# Patient Record
Sex: FEMALE | Race: BLACK OR AFRICAN AMERICAN | Employment: UNEMPLOYED | URBAN - METROPOLITAN AREA
[De-identification: names, ages, dates, MRNs, and addresses within clinical notes are randomized per-mention and may not be internally consistent; named-entity substitution may affect disease eponyms.]

---

## 2019-05-15 ENCOUNTER — INITIAL CONSULT (OUTPATIENT)
Dept: BARIATRICS/WEIGHT MGMT | Age: 19
End: 2019-05-15
Payer: COMMERCIAL

## 2019-05-15 VITALS
HEIGHT: 68 IN | BODY MASS INDEX: 44.41 KG/M2 | DIASTOLIC BLOOD PRESSURE: 84 MMHG | HEART RATE: 73 BPM | TEMPERATURE: 97.4 F | WEIGHT: 293 LBS | RESPIRATION RATE: 20 BRPM | SYSTOLIC BLOOD PRESSURE: 141 MMHG

## 2019-05-15 DIAGNOSIS — R10.13 EPIGASTRIC PAIN: Primary | ICD-10-CM

## 2019-05-15 PROCEDURE — G8417 CALC BMI ABV UP PARAM F/U: HCPCS | Performed by: SURGERY

## 2019-05-15 PROCEDURE — 99202 OFFICE O/P NEW SF 15 MIN: CPT

## 2019-05-15 PROCEDURE — G8427 DOCREV CUR MEDS BY ELIG CLIN: HCPCS | Performed by: SURGERY

## 2019-05-15 PROCEDURE — 99204 OFFICE O/P NEW MOD 45 MIN: CPT | Performed by: SURGERY

## 2019-05-15 PROCEDURE — 1036F TOBACCO NON-USER: CPT | Performed by: SURGERY

## 2019-05-15 NOTE — PROGRESS NOTES
Marcus Chang  5/15/2019  [facility]    Initial Evaluation  Bariatric Surgery and EGD       Subjective:  CHIEF COMPLAINT: Morbid obesity, malnutrition, GERD    HISTORY OF PRESENT ILLNESS: Christopher Smith is a morbidly-obese 19 y.o.  female, who weighs (!) 336 lb (152.4 kg). She is 171 pounds over her ideal body weight. The Body mass index is 51.09 kg/m². She has multiple medical problems aggravated by her obesity. She wishes to have bariatric surgery so that she can lose a large amount of weight and keep the weight off. I have [facility] with [facility] in the [facility] where we discussed the surgery in great detail and went over the risks and benefits. She has watched our informational video so she understands all of the extensive risks involved. She states that she understands all of the risks and wishes to proceed with the evaluation. Past Medical HistoryThere is no problem list on file for this patient. Past Surgical HistoryNo past surgical history on file. Allergies: Latex     Home Medications  Current Outpatient Medications   Medication Sig Dispense Refill    naproxen (NAPROSYN) 500 MG tablet Take 500 mg by mouth 2 times daily as needed for Pain      famotidine (PEPCID) 20 MG tablet Take 1 tablet by mouth 2 times daily for 10 days 20 tablet 0     No current facility-administered medications for this visit. Social History:   TOBACCO:   reports that she has never smoked. She has never used smokeless tobacco.  All smokers must join the free smoking cessation program and stop smoking for 3 months before having any Bariatric surgery. ETOH:    reports that she does not drink alcohol. The patient has a family history that is negative for severe cardiovascular or respiratory issues, negative for reaction to anesthesia.       Review of Systems    Review of Systems - General ROS: negative for - chills, fatigue or malaise  ENT ROS: negative for - hearing change, nasal congestion or nasal discharge  Allergy and Immunology ROS: negative for - hives, itchy/watery eyes or nasal congestion  Hematological and Lymphatic ROS: negative for - blood clots, blood transfusions, bruising or fatigue  Endocrine ROS: negative for - malaise/lethargy, mood swings, palpitations or polydipsia/polyuria  Breast ROS: negative for - new or changing breast lumps or nipple changes  Respiratory ROS: negative for - sputum changes, stridor, tachypnea or wheezing  Cardiovascular ROS: negative for - irregular heartbeat, loss of consciousness, murmur or orthopnea  Gastrointestinal ROS: negative for - constipation, diarrhea, gas/bloating, heartburn or hematemesis  Genito-Urinary ROS: negative for - genital discharge, genital ulcers or hematuria  Musculoskeletal ROS: negative for - gait disturbance, muscle pain or muscular weakness}    Physical Exam:   VITALS: BP (!) 141/84 (Site: Left Upper Arm, Position: Sitting, Cuff Size: Medium Adult)   Pulse 73   Temp 97.4 °F (36.3 °C) (Temporal)   Resp 20   Ht 5' 8\" (1.727 m)   Wt (!) 336 lb (152.4 kg)   LMP 05/02/2019 (Exact Date)   BMI 51.09 kg/m²   General appearance: alert, appears stated age and cooperative  Head: Normocephalic, without obvious abnormality, atraumatic  Neck: no adenopathy, no carotid bruit, no JVD, supple, symmetrical, trachea midline and thyroid not enlarged, symmetric, no tenderness/mass/nodules  Lungs: clear to auscultation bilaterally  Heart: regular rate and rhythm  Abdomen: soft, non-tender; bowel sounds normal; no masses,  no organomegaly  Extremities: extremities normal, atraumatic, no cyanosis or edema    Assessment:  Morbid obesity with inability to keep the weight off with diet and exercise. She is interested in Laparoscopic Angelique-en- Y Gastric Bypass or Sleeve Gastrectomy. We discussed that our goal is to ameliorate the medical problems and not to obtain a specific body mass index.  She understands the risks and benefits, and wishes to proceed with the evaluation. Plan:  Psych evaluation, medical and cardio/pulmonary clearance, mammogram, pap test, gallbladder ultrasound. These patients often have vitamin deficiencies so I will do screening labs for malnutrition. I will do an upper endoscopy to check for hiatal hernias, ulcers and H. Pylori while we wait for insurance approval for the surgery.     Physician Signature: Electronically signed by Dr. Tolu Cisneros MD    Send copy of H&P to PCP, JANINE Salmeron NP

## 2019-05-15 NOTE — PATIENT INSTRUCTIONS
What is the next step to proceed with weight loss surgery? Please be aware that any co-pays or deductibles may be requested prior to testing and / or procedures. You will need to schedule a psychological evaluation for weight loss surgery. Patients will be required to complete all psychological testing as required by the psychologist. Patients must follow all of the psychologist's recommendations before weight loss surgery can be scheduled. The evaluation must be done a standard way for weight loss surgery. We strongly recommend that you contact one of our preferred providers listed below to arrange this:    Dr. Eugene Luke, PhD [address]        [phone]      Dr. Cheyenne Garcia, PhD   [facility]. [place], [place]         [phone]    You will also need to plan on attending a 2 hour nutrition class at the [facility] prior to your surgery. We will schedule this for you when we schedule your surgery. Please remember to have your labs drawn prior to your scheduled appointment to review the results of your testing. Please remember, that while we will submit your case to insurance for surgery authorization, it is your responsibility to know if your plan covers weight loss surgery and keep up-to-date with changes to your insurance coverage. We will do everything possible to help you get approved for weight loss surgery, but cannot guarantee an approval.     Please note that you will not be submitted to your insurance company until all   pre-operative testing requirements are met.

## 2019-05-20 ENCOUNTER — PREP FOR PROCEDURE (OUTPATIENT)
Dept: SURGERY | Age: 19
End: 2019-05-20

## 2019-05-20 RX ORDER — SODIUM CHLORIDE 0.9 % (FLUSH) 0.9 %
10 SYRINGE (ML) INJECTION EVERY 12 HOURS SCHEDULED
Status: CANCELLED | OUTPATIENT
Start: 2019-05-20

## 2019-05-20 RX ORDER — SODIUM CHLORIDE, SODIUM LACTATE, POTASSIUM CHLORIDE, CALCIUM CHLORIDE 600; 310; 30; 20 MG/100ML; MG/100ML; MG/100ML; MG/100ML
INJECTION, SOLUTION INTRAVENOUS CONTINUOUS
Status: CANCELLED | OUTPATIENT
Start: 2019-05-20

## 2019-05-20 RX ORDER — 0.9 % SODIUM CHLORIDE 0.9 %
10 VIAL (ML) INJECTION PRN
Status: CANCELLED | OUTPATIENT
Start: 2019-05-20

## 2019-06-04 ENCOUNTER — HOSPITAL ENCOUNTER (OUTPATIENT)
Dept: ULTRASOUND IMAGING | Age: 19
Discharge: HOME | End: 2019-06-04
Payer: COMMERCIAL

## 2019-06-04 DIAGNOSIS — R10.13 EPIGASTRIC PAIN: ICD-10-CM

## 2019-06-04 PROCEDURE — 76705 ECHO EXAM OF ABDOMEN: CPT

## 2019-06-06 RX ORDER — CYCLOBENZAPRINE HCL 10 MG
10 TABLET ORAL 3 TIMES DAILY PRN
COMMUNITY
End: 2020-06-04

## 2019-06-06 NOTE — PROGRESS NOTES
[facility]                                                                                                                    PRE OP INSTRUCTIONS FOR  Elbert Champagne        Date: 6/6/2019    Date of surgery: 6/7/19   Arrival Time: Hospital will call you between 5pm and 7pm with your final arrival time for surgery    1. Do not eat or drink anything after midnight prior to surgery. This includes no water, chewing gum, mints or ice chips. 2. Take the following medications with a small sip of water on the morning of Surgery: none     3. Diabetics may take evening dose of insulin but none after midnight. If you feel symptomatic or low blood sugar morning of surgery drink 1-2 ounces of apple juice only. 4. Aspirin, Ibuprofen, Advil, Naproxen, Vitamin E and other Anti-inflammatory products should be stopped  before surgery  as directed by your physician. Take Tylenol only unless instructed otherwise by your surgeon. 5. Check with your Doctor regarding stopping Plavix, Coumadin, Lovenox, Eliquis, Effient, or other blood thinners. 6. Do not smoke,use illicit drugs and do not drink any alcoholic beverages 24 hours prior to surgery. 7. You may brush your teeth the morning of surgery. DO NOT SWALLOW WATER    8. You MUST make arrangements for a responsible adult to take you home after your surgery. You will not be allowed to leave alone or drive yourself home. It is strongly suggested someone stay with you the first 24 hrs. Your surgery will be cancelled if you do not have a ride home. 9. PEDIATRIC PATIENTS ONLY:  A parent/legal guardian must accompany a child scheduled for surgery and plan to stay at the hospital until the child is discharged. Please do not bring other children with you.     10. Please wear simple, loose fitting clothing to the hospital.  Berhane Lovell not bring valuables (money, credit cards, checkbooks, etc.) Do not wear any makeup (including no eye makeup) or nail polish on your fingers or toes. 11. DO NOT wear any jewelry or piercings on day of surgery. All body piercing jewelry must be removed. 12. Shower the night before surgery with _x__Antibacterial soap /PARISH WIPES________    13. TOTAL JOINT REPLACEMENT/HYSTERECTOMY PATIENTS ONLY---Remember to bring Blood Bank bracelet to the hospital on the day of surgery. 14. If you have a Living Will and Durable Power of  for Healthcare, please bring in a copy. 15. If appropriate bring crutches, inspirex, WALKER, CANE etc... 12. Notify your Surgeon if you develop any illness between now and surgery time, cough, cold, fever, sore throat, nausea, vomiting, etc.  Please notify your surgeon if you experience dizziness, shortness of breath or blurred vision between now & the time of your surgery. 17. If you have ___dentures, they will be removed before going to the OR; we will provide you a container. If you wear ___contact lenses or _x__glasses, they will be removed; please bring a case for them. 18. To provide excellent care visitors will be limited to 2 in the room at any given time. 19. Please bring picture ID and insurance card. 20. Sleep apnea patients need to bring CPAP AND SETTINGS to hospital on day of surgery. 21. During flu season no children under the age of 15 are permitted in the hospital for the safety of all patients. 22. Other                  Please call AMBULATORY CARE if you have any further questions.    [facility]     [address]

## 2019-06-07 ENCOUNTER — ANESTHESIA EVENT (OUTPATIENT)
Dept: ENDOSCOPY | Age: 19
End: 2019-06-07
Payer: COMMERCIAL

## 2019-06-07 ENCOUNTER — ANESTHESIA (OUTPATIENT)
Dept: ENDOSCOPY | Age: 19
End: 2019-06-07
Payer: COMMERCIAL

## 2019-06-07 ENCOUNTER — HOSPITAL ENCOUNTER (OUTPATIENT)
Age: 19
Setting detail: OUTPATIENT SURGERY
Discharge: HOME | End: 2019-06-07
Attending: SURGERY | Admitting: SURGERY
Payer: COMMERCIAL

## 2019-06-07 VITALS
BODY MASS INDEX: 44.41 KG/M2 | TEMPERATURE: 97.6 F | DIASTOLIC BLOOD PRESSURE: 78 MMHG | RESPIRATION RATE: 16 BRPM | HEIGHT: 68 IN | SYSTOLIC BLOOD PRESSURE: 118 MMHG | OXYGEN SATURATION: 100 % | HEART RATE: 72 BPM | WEIGHT: 293 LBS

## 2019-06-07 VITALS
SYSTOLIC BLOOD PRESSURE: 143 MMHG | OXYGEN SATURATION: 100 % | DIASTOLIC BLOOD PRESSURE: 72 MMHG | RESPIRATION RATE: 30 BRPM

## 2019-06-07 DIAGNOSIS — R10.13 EPIGASTRIC PAIN: ICD-10-CM

## 2019-06-07 LAB
ALBUMIN SERPL-MCNC: 3.8 G/DL (ref 3.5–5.2)
ALP BLD-CCNC: 87 U/L (ref 35–104)
ALT SERPL-CCNC: 17 U/L (ref 0–32)
ANION GAP SERPL CALCULATED.3IONS-SCNC: 8 MMOL/L (ref 7–16)
AST SERPL-CCNC: 15 U/L (ref 0–31)
BILIRUB SERPL-MCNC: 0.3 MG/DL (ref 0–1.2)
BUN BLDV-MCNC: 7 MG/DL (ref 6–20)
CALCIUM SERPL-MCNC: 8.9 MG/DL (ref 8.6–10.2)
CHLORIDE BLD-SCNC: 102 MMOL/L (ref 98–107)
CHOLESTEROL, TOTAL: 151 MG/DL (ref 0–199)
CO2: 27 MMOL/L (ref 22–29)
CREAT SERPL-MCNC: 0.6 MG/DL (ref 0.5–1)
FERRITIN: 7 NG/ML
FOLATE: 6 NG/ML (ref 4.8–24.2)
GFR AFRICAN AMERICAN: >60
GFR NON-AFRICAN AMERICAN: >60 ML/MIN/1.73
GLUCOSE BLD-MCNC: 103 MG/DL (ref 74–99)
HBA1C MFR BLD: 4.9 % (ref 4–5.6)
HCG(URINE) PREGNANCY TEST: NEGATIVE
HCT VFR BLD CALC: 30.4 % (ref 34–48)
HEMOGLOBIN: 8.8 G/DL (ref 11.5–15.5)
MCH RBC QN AUTO: 22.9 PG (ref 26–35)
MCHC RBC AUTO-ENTMCNC: 28.9 % (ref 32–34.5)
MCV RBC AUTO: 79.2 FL (ref 80–99.9)
PDW BLD-RTO: 15.4 FL (ref 11.5–15)
PLATELET # BLD: 264 E9/L (ref 130–450)
PMV BLD AUTO: 9.4 FL (ref 7–12)
POTASSIUM SERPL-SCNC: 3.9 MMOL/L (ref 3.5–5)
RBC # BLD: 3.84 E12/L (ref 3.5–5.5)
SODIUM BLD-SCNC: 137 MMOL/L (ref 132–146)
TOTAL PROTEIN: 7 G/DL (ref 6.4–8.3)
TRIGL SERPL-MCNC: 80 MG/DL (ref 0–149)
VITAMIN B-12: 371 PG/ML (ref 211–946)
VITAMIN D 25-HYDROXY: 7 NG/ML (ref 30–100)
WBC # BLD: 4.3 E9/L (ref 4.5–11.5)

## 2019-06-07 PROCEDURE — 82607 VITAMIN B-12: CPT

## 2019-06-07 PROCEDURE — 84630 ASSAY OF ZINC: CPT

## 2019-06-07 PROCEDURE — 36415 COLL VENOUS BLD VENIPUNCTURE: CPT

## 2019-06-07 PROCEDURE — 7100000011 HC PHASE II RECOVERY - ADDTL 15 MIN: Performed by: SURGERY

## 2019-06-07 PROCEDURE — 85027 COMPLETE CBC AUTOMATED: CPT

## 2019-06-07 PROCEDURE — 7100000010 HC PHASE II RECOVERY - FIRST 15 MIN: Performed by: SURGERY

## 2019-06-07 PROCEDURE — 88305 TISSUE EXAM BY PATHOLOGIST: CPT

## 2019-06-07 PROCEDURE — 99024 POSTOP FOLLOW-UP VISIT: CPT | Performed by: SURGERY

## 2019-06-07 PROCEDURE — 81025 URINE PREGNANCY TEST: CPT

## 2019-06-07 PROCEDURE — 80053 COMPREHEN METABOLIC PANEL: CPT

## 2019-06-07 PROCEDURE — 84478 ASSAY OF TRIGLYCERIDES: CPT

## 2019-06-07 PROCEDURE — 88342 IMHCHEM/IMCYTCHM 1ST ANTB: CPT

## 2019-06-07 PROCEDURE — 82746 ASSAY OF FOLIC ACID SERUM: CPT

## 2019-06-07 PROCEDURE — 84425 ASSAY OF VITAMIN B-1: CPT

## 2019-06-07 PROCEDURE — 82306 VITAMIN D 25 HYDROXY: CPT

## 2019-06-07 PROCEDURE — 2580000003 HC RX 258: Performed by: SURGERY

## 2019-06-07 PROCEDURE — 43239 EGD BIOPSY SINGLE/MULTIPLE: CPT | Performed by: SURGERY

## 2019-06-07 PROCEDURE — 82465 ASSAY BLD/SERUM CHOLESTEROL: CPT

## 2019-06-07 PROCEDURE — 3700000000 HC ANESTHESIA ATTENDED CARE: Performed by: SURGERY

## 2019-06-07 PROCEDURE — 82728 ASSAY OF FERRITIN: CPT

## 2019-06-07 PROCEDURE — 3609012400 HC EGD TRANSORAL BIOPSY SINGLE/MULTIPLE: Performed by: SURGERY

## 2019-06-07 PROCEDURE — 2709999900 HC NON-CHARGEABLE SUPPLY: Performed by: SURGERY

## 2019-06-07 PROCEDURE — 6360000002 HC RX W HCPCS: Performed by: NURSE ANESTHETIST, CERTIFIED REGISTERED

## 2019-06-07 PROCEDURE — 2580000003 HC RX 258: Performed by: NURSE ANESTHETIST, CERTIFIED REGISTERED

## 2019-06-07 PROCEDURE — 83036 HEMOGLOBIN GLYCOSYLATED A1C: CPT

## 2019-06-07 RX ORDER — SODIUM CHLORIDE 0.9 % (FLUSH) 0.9 %
10 SYRINGE (ML) INJECTION PRN
Status: DISCONTINUED | OUTPATIENT
Start: 2019-06-07 | End: 2019-06-07 | Stop reason: HOSPADM

## 2019-06-07 RX ORDER — SODIUM CHLORIDE 0.9 % (FLUSH) 0.9 %
10 SYRINGE (ML) INJECTION EVERY 12 HOURS SCHEDULED
Status: DISCONTINUED | OUTPATIENT
Start: 2019-06-07 | End: 2019-06-07 | Stop reason: HOSPADM

## 2019-06-07 RX ORDER — SODIUM CHLORIDE, SODIUM LACTATE, POTASSIUM CHLORIDE, CALCIUM CHLORIDE 600; 310; 30; 20 MG/100ML; MG/100ML; MG/100ML; MG/100ML
INJECTION, SOLUTION INTRAVENOUS CONTINUOUS PRN
Status: DISCONTINUED | OUTPATIENT
Start: 2019-06-07 | End: 2019-06-07 | Stop reason: SDUPTHER

## 2019-06-07 RX ORDER — SODIUM CHLORIDE, SODIUM LACTATE, POTASSIUM CHLORIDE, CALCIUM CHLORIDE 600; 310; 30; 20 MG/100ML; MG/100ML; MG/100ML; MG/100ML
INJECTION, SOLUTION INTRAVENOUS CONTINUOUS
Status: DISCONTINUED | OUTPATIENT
Start: 2019-06-07 | End: 2019-06-07 | Stop reason: HOSPADM

## 2019-06-07 RX ORDER — PROPOFOL 10 MG/ML
INJECTION, EMULSION INTRAVENOUS PRN
Status: DISCONTINUED | OUTPATIENT
Start: 2019-06-07 | End: 2019-06-07 | Stop reason: SDUPTHER

## 2019-06-07 RX ADMIN — PROPOFOL 200 MG: 10 INJECTION, EMULSION INTRAVENOUS at 09:56

## 2019-06-07 RX ADMIN — SODIUM CHLORIDE, POTASSIUM CHLORIDE, SODIUM LACTATE AND CALCIUM CHLORIDE: 600; 310; 30; 20 INJECTION, SOLUTION INTRAVENOUS at 09:46

## 2019-06-07 RX ADMIN — SODIUM CHLORIDE, POTASSIUM CHLORIDE, SODIUM LACTATE AND CALCIUM CHLORIDE: 600; 310; 30; 20 INJECTION, SOLUTION INTRAVENOUS at 09:29

## 2019-06-07 ASSESSMENT — PAIN SCALES - GENERAL
PAINLEVEL_OUTOF10: 0
PAINLEVEL_OUTOF10: 0

## 2019-06-07 ASSESSMENT — PAIN - FUNCTIONAL ASSESSMENT: PAIN_FUNCTIONAL_ASSESSMENT: 0-10

## 2019-06-07 ASSESSMENT — LIFESTYLE VARIABLES: SMOKING_STATUS: 0

## 2019-06-07 NOTE — ANESTHESIA POSTPROCEDURE EVALUATION
Department of Anesthesiology  Postprocedure Note    Patient: Ricardo Lizama  MRN: [id number]  YOB: 2000  Date of evaluation: 6/7/2019  Time:  1:30 PM     Procedure Summary     Date:  06/07/19 Room / Location:  [facility] 01 / [facility] ENDOSCOPY    Anesthesia Start:  0946 Anesthesia Stop:  4961    Procedure:  EGD BIOPSY (N/A ) Diagnosis:  (GERD)    Surgeon:  Anne Javed MD Responsible Provider:  Angel Waters MD    Anesthesia Type:  MAC ASA Status:  3          Anesthesia Type: MAC    Ulises Phase I: Ulises Score: 10    Ulises Phase II: Ulises Score: 10    Last vitals: Reviewed and per EMR flowsheets.        Anesthesia Post Evaluation    Patient location during evaluation: PACU  Patient participation: complete - patient participated  Level of consciousness: awake and alert  Airway patency: patent  Nausea & Vomiting: no nausea and no vomiting  Complications: no  Cardiovascular status: hemodynamically stable  Respiratory status: acceptable  Hydration status: euvolemic

## 2019-06-07 NOTE — OP NOTE
SURGEON: Cinthia Mederos M.D. PREOPERATIVE DIAGNOSES:  gerd    POSTOPERATIVE DIAGNOSES: normal EGD     OPERATION: Pexutrfe-lctpfs-vfvyeypoundi with biopsy    ANESTHESIA: LMAC    COMPLICATIONS: None. OPERATIONS: The patient was placed on the table in left lateral decubitus position and sedated. Bite block was placed. A lubricated scope was easily passed into the upper esophagus which looked normal. The distal esophagus looked normal. The scope was passed into the stomach and retroflexed. There was no hiatal hernia. The scope was passed down toward the pylorus. The antral mucosa all looked normal. Biopsy was taken to check for H. pylori. The scope was then passed through the pylorus into the duodenal bulb which looked normal, then around to the distal duodenum which looked normal, and the scope was then withdrawn. The GE junction was at 36 cm from the teeth. The patient tolerated the procedure well.      Physician Signature: Electronically signed by Dr. Fernando Gamino

## 2019-06-07 NOTE — H&P
negative for - hearing change, nasal congestion or nasal discharge  Allergy and Immunology ROS: negative for - hives, itchy/watery eyes or nasal congestion  Hematological and Lymphatic ROS: negative for - blood clots, blood transfusions, bruising or fatigue  Endocrine ROS: negative for - malaise/lethargy, mood swings, palpitations or polydipsia/polyuria  Breast ROS: negative for - new or changing breast lumps or nipple changes  Respiratory ROS: negative for - sputum changes, stridor, tachypnea or wheezing  Cardiovascular ROS: negative for - irregular heartbeat, loss of consciousness, murmur or orthopnea  Gastrointestinal ROS: negative for - constipation, diarrhea, gas/bloating, heartburn or hematemesis  Genito-Urinary ROS: negative for - genital discharge, genital ulcers or hematuria  Musculoskeletal ROS: negative for - gait disturbance, muscle pain or muscular weakness}     Physical Exam:   BP (!) 143/74   Pulse 92   Temp 96.5 °F (35.8 °C) (Temporal)   Resp 20   Ht 5' 8\" (1.727 m)   Wt (!) 339 lb 3.2 oz (153.9 kg)   LMP 06/01/2019   SpO2 100%   BMI 51.58 kg/m²      General appearance: alert, appears stated age and cooperative  Head: Normocephalic, without obvious abnormality, atraumatic  Neck: no adenopathy, no carotid bruit, no JVD, supple, symmetrical, trachea midline and thyroid not enlarged, symmetric, no tenderness/mass/nodules  Lungs: clear to auscultation bilaterally  Heart: regular rate and rhythm  Abdomen: soft, non-tender; bowel sounds normal; no masses,  no organomegaly  Extremities: extremities normal, atraumatic, no cyanosis or edema     Assessment:  Morbid obesity with inability to keep the weight off with diet and exercise. She is interested in Laparoscopic Angelique-en- Y Gastric Bypass or Sleeve Gastrectomy. We discussed that our goal is to ameliorate the medical problems and not to obtain a specific body mass index.  She understands the risks and benefits, and wishes to proceed with the evaluation.     Plan:  Psych evaluation, medical and cardio/pulmonary clearance, mammogram, pap test, gallbladder ultrasound. These patients often have vitamin deficiencies so I will do screening labs for malnutrition.  I will do an upper endoscopy to check for hiatal hernias, ulcers and H. Pylori while we wait for insurance approval for the surgery.     Physician Signature: Electronically signed by Dr. Ela Wallace MD

## 2019-06-07 NOTE — ANESTHESIA PRE PROCEDURE
Department of Anesthesiology  Preprocedure Note       Name:  Abebe Goel   Age:  19 y.o.  :  2000                                          MRN:  [id number]         Date:  2019      Surgeon: Stefanie Teague):  Lasha Torres MD    Procedure: EGD ESOPHAGOGASTRODUODENOSCOPY (N/A )    Medications prior to admission:   Prior to Admission medications    Medication Sig Start Date End Date Taking? Authorizing Provider   cyclobenzaprine (FLEXERIL) 10 MG tablet Take 10 mg by mouth 3 times daily as needed for Muscle spasms   Yes Historical Provider, MD   naproxen (NAPROSYN) 500 MG tablet Take 500 mg by mouth 2 times daily as needed for Pain   Yes Historical Provider, MD       Current medications:    Current Facility-Administered Medications   Medication Dose Route Frequency Provider Last Rate Last Dose    lactated ringers infusion   Intravenous Continuous Lasha Torres MD        sodium chloride flush 0.9 % injection 10 mL  10 mL Intravenous PRN Lasha Torres MD        sodium chloride flush 0.9 % injection 10 mL  10 mL Intravenous 2 times per day Lasha Torres MD           Allergies: Allergies   Allergen Reactions    Latex Hives    Other      enviromental -- sun, grass       Problem List:  There is no problem list on file for this patient. Past Medical History:        Diagnosis Date    Headache     Sciatica        Past Surgical History:  History reviewed. No pertinent surgical history. Social History:    Social History     Tobacco Use    Smoking status: Never Smoker    Smokeless tobacco: Never Used   Substance Use Topics    Alcohol use: Yes     Comment: occas.                                 Counseling given: Not Answered      Vital Signs (Current):   Vitals:    19 0905   BP: (!) 143/74   Pulse: 92   Resp: 20   Temp: 96.5 °F (35.8 °C)   TempSrc: Temporal   SpO2: 100%   Weight: (!) 339 lb 3.2 oz (153.9 kg)   Height: 5' 8\" (1.727 m)                                              BP Readings from Last 3 Encounters:   06/07/19 (!) 143/74   05/15/19 (!) 141/84   07/15/17 (!) 141/78       NPO Status: Time of last liquid consumption: 2000                        Time of last solid consumption: 1700                        Date of last liquid consumption: 06/06/19                        Date of last solid food consumption: 06/06/19    BMI:   Wt Readings from Last 3 Encounters:   06/07/19 (!) 339 lb 3.2 oz (153.9 kg)   05/15/19 (!) 336 lb (152.4 kg)   07/15/17 (!) 284 lb 6.4 oz (129 kg) (>99 %, Z= 2.67)*     * Growth percentiles are based on CDC (Girls, 2-20 Years) data. Body mass index is 51.58 kg/m². CBC: No results found for: WBC, RBC, HGB, HCT, MCV, RDW, PLT    CMP: No results found for: NA, K, CL, CO2, BUN, CREATININE, GFRAA, AGRATIO, LABGLOM, GLUCOSE, PROT, CALCIUM, BILITOT, ALKPHOS, AST, ALT    POC Tests: No results for input(s): POCGLU, POCNA, POCK, POCCL, POCBUN, POCHEMO, POCHCT in the last 72 hours. Coags: No results found for: PROTIME, INR, APTT    HCG (If Applicable):   Lab Results   Component Value Date    PREGTESTUR Negitive 07/15/2017        ABGs: No results found for: PHART, PO2ART, WFK0ZMJ, JTB2EOU, BEART, [id number]     Type & Screen (If Applicable):  No results found for: [facility]    Anesthesia Evaluation  Patient summary reviewed no history of anesthetic complications:   Airway: Mallampati: III  TM distance: >3 FB   Neck ROM: full  Mouth opening: > = 3 FB Dental: normal exam         Pulmonary: breath sounds clear to auscultation      (-) not a current smoker                           Cardiovascular:Negative CV ROS            Rhythm: regular  Rate: normal                    Neuro/Psych:   (+) neuromuscular disease:, headaches:,             GI/Hepatic/Renal:   (+) morbid obesity          Endo/Other: Negative Endo/Other ROS                    Abdominal:   (+) obese,         Vascular: negative vascular ROS.                                      Anesthesia Plan      MAC ASA 3       Induction: intravenous. Anesthetic plan and risks discussed with patient. Plan discussed with CRNA. DOS STAFF ADDENDUM:    Pt seen and examined, chart reviewed (including anesthesia, drug and allergy history). Anesthetic plan, risks, benefits, alternatives, and personnel involved discussed with patient. Patient verbalized an understanding and agrees to proceed. Plan discussed with care team members and agreed upon.     Gabriel Jack MD  Staff Anesthesiologist  9:24 AM        Gabriel Jack MD   6/7/2019

## 2019-06-10 LAB — ZINC: 74.1 UG/DL (ref 60–120)

## 2019-06-12 LAB — VITAMIN B1 WHOLE BLOOD: 96 NMOL/L (ref 70–180)

## 2019-06-19 ENCOUNTER — INITIAL CONSULT (OUTPATIENT)
Dept: BARIATRICS/WEIGHT MGMT | Age: 19
End: 2019-06-19
Payer: COMMERCIAL

## 2019-06-19 ENCOUNTER — OFFICE VISIT (OUTPATIENT)
Dept: BARIATRICS/WEIGHT MGMT | Age: 19
End: 2019-06-19
Payer: COMMERCIAL

## 2019-06-19 VITALS
RESPIRATION RATE: 20 BRPM | DIASTOLIC BLOOD PRESSURE: 93 MMHG | WEIGHT: 293 LBS | HEART RATE: 77 BPM | HEIGHT: 68 IN | BODY MASS INDEX: 44.41 KG/M2 | SYSTOLIC BLOOD PRESSURE: 145 MMHG | TEMPERATURE: 97.3 F

## 2019-06-19 VITALS — HEIGHT: 68 IN | WEIGHT: 293 LBS | BODY MASS INDEX: 44.41 KG/M2

## 2019-06-19 DIAGNOSIS — A04.8 H. PYLORI INFECTION: ICD-10-CM

## 2019-06-19 DIAGNOSIS — D50.8 IRON DEFICIENCY ANEMIA SECONDARY TO INADEQUATE DIETARY IRON INTAKE: Primary | ICD-10-CM

## 2019-06-19 DIAGNOSIS — Z01.818 PRE-OP EVALUATION: Primary | ICD-10-CM

## 2019-06-19 DIAGNOSIS — Z71.3 DIETARY COUNSELING: Primary | ICD-10-CM

## 2019-06-19 PROCEDURE — 99211 OFF/OP EST MAY X REQ PHY/QHP: CPT

## 2019-06-19 PROCEDURE — 99213 OFFICE O/P EST LOW 20 MIN: CPT | Performed by: SURGERY

## 2019-06-19 PROCEDURE — 99999 PR OFFICE/OUTPT VISIT,PROCEDURE ONLY: CPT

## 2019-06-19 PROCEDURE — G8427 DOCREV CUR MEDS BY ELIG CLIN: HCPCS | Performed by: SURGERY

## 2019-06-19 PROCEDURE — 1036F TOBACCO NON-USER: CPT | Performed by: SURGERY

## 2019-06-19 PROCEDURE — G8417 CALC BMI ABV UP PARAM F/U: HCPCS | Performed by: SURGERY

## 2019-06-19 RX ORDER — LORATADINE 10 MG/1
10 TABLET ORAL PRN
COMMUNITY
Start: 2019-05-30

## 2019-06-19 RX ORDER — AMOXICILLIN 250 MG/1
250 CAPSULE ORAL 3 TIMES DAILY
Qty: 30 CAPSULE | Refills: 0 | Status: SHIPPED | OUTPATIENT
Start: 2019-06-19 | End: 2019-06-29

## 2019-06-19 NOTE — PATIENT INSTRUCTIONS
What is the next step to proceed with weight loss surgery? Please be aware that any co-pays or deductibles may be requested prior to testing and / or procedures. You will need to schedule a psychological evaluation for weight loss surgery. Patients will be required to complete all psychological testing as required by the psychologist. Patients must follow all of the psychologist's recommendations before weight loss surgery can be scheduled. The evaluation must be done a standard way for weight loss surgery. We strongly recommend that you contact one of our preferred providers listed below to arrange this:    Dr. Jeanne Burton, PhD [address]        [phone]      Dr. Tess Medina, PhD   [facility]. [place], [place]         [phone]    You will also need to plan on attending a 2 hour nutrition class at the [facility] prior to your surgery. We will schedule this for you when we schedule your surgery. Please remember to have your labs drawn prior to your scheduled appointment to review the results of your testing. Please remember, that while we will submit your case to insurance for surgery authorization, it is your responsibility to know if your plan covers weight loss surgery and keep up-to-date with changes to your insurance coverage. We will do everything possible to help you get approved for weight loss surgery, but cannot guarantee an approval.     Please note that you will not be submitted to your insurance company until all   pre-operative testing requirements are met.

## 2019-06-19 NOTE — PATIENT INSTRUCTIONS
make sure to check with us to see if we have received your psychological evaluation. Please be aware that any co-pays or deductibles may be requested prior to testing and / or procedures. You will need to schedule a psychological evaluation for weight loss surgery. Patients will be required to complete all psychological testing as required by the psychologist. Patients must follow all of the psychologist's recommendations before weight loss surgery can be scheduled. The evaluation must be done a standard way for weight loss surgery. We strongly recommend that you contact one of our preferred providers listed below to arrange this:     Dr. Ortiz August, PhD           [address]                                                                                                [phone]        Dr. Elise Caruso, PhD                         [facility]. [place], [place]                                                                                              [phone]     You will also need to plan on attending a 2 hour nutrition class at the [facility] prior to your surgery. We will schedule this for you when we schedule your surgery. Please remember to have your labs drawn prior to your scheduled appointment to review the results of your testing. Please remember, that while we will submit your case to insurance for surgery authorization, it is your responsibility to know if your plan covers weight loss surgery and keep up-to-date with changes to your insurance coverage. We will do everything possible to help you get approved for weight loss surgery, but cannot guarantee an approval.      Please note that you will not be submitted to your insurance company until all   pre-operative testing requirements are met.             · Please remember you need to schedule to attend one Support Group meeting held at the [facility] before surgery. This one meeting is mandatory. You can attend as many support group meetings before and after surgery. Support group meetings are held at the [facility] from 6:00 - 8:00pm 1st, and 3rd Tuesday of every month. See dates listed below. · Each individual person has his / her own medical requirements that need fulfilled before being able to schedule bariatric surgery . Please finalize these requirements by contacting our Bariatric Nurse at [phone]. High Vitamin D Foods:  Written By: Francisco Zheng RD/KAY    What role does Vitamin D play in the body? Vitamin D is known as the sunshine vitamin. Vitamin D is actually a hormone that is produced in our bodies by ultraviolet B rays. These light rays trigger the production of vitamin D by our skin cells. The hormone that is produced is called calcitriol and once it is made it travels to the intestines to help with the absorption of dietary calcium, as well as fluoride. How does Vitamin D work? Vitamin D and Calcium work together to promote the growth of bones and development of healthy teeth. The amount of vitamin D you produce effects the amount of calcium that can be absorbed by the body. When calcium from foods you eat reaches the intestines, it waits for the presence of the vitamin D hormone, calcitriol, to be able to cross the intestinal membranes and to be transported to your bones. Without sufficient vitamin D, calcium levels in the bones and teeth will decrease leading to weak, brittle bones and increasing the possibility of osteoporosis. WHY? Calcium is primarily found in the blood and needs to remain balanced at a constant level. The calcium that is in the blood is there to be used for quick transport to muscles and nerves when required. When calcium is sent to required organs, the calcium levels in the blood become low.   Calcium is then pulled out of the bones and goes into the blood to replace what was sent to oz. 3 IU   Oysters 4 3 IU   Butter  1 tsp 1.4 IU            Unfortunately after RYGB surgery you will not be able to increase your Vitamin D with diet alone. A Vitamin D supplement will be needed in order to improve Vitamin D lab values.

## 2019-06-19 NOTE — PROGRESS NOTES
Allergies: no  -   Food Intolerances: no -     Yes - Past medically assisted weight loss history reviewed. Yes - Provided education regarding the basic role of nutrition in junction with Bariatric Surgery. Yes - Provided overview of required dietary and behavioral changes pre and post operatively. Yes - Stated / reinforced that changes in dietary behaviors are critical to successful, long term weight Loss. Patient is aware that in order to proceed with bariatric surgery he / she will need to follow a low-fat diet prior to surgery. Patient is aware that bariatric surgery is a lifetime change that will require the patient to follow a low-fat, low-calorie, low-sugar, portion controlled diet with at least 30 minutes of physical activity daily. Patient is aware that certain foods may not be tolerated after bariatric surgery and certain foods will need avoided all together. Alba Arthur / LD feels that this patient knowledge / readiness to make appropriate diet / lifestyle changes assessed to be? - Good    RD / LD feels this patients expected adherence for post surgical diet? - Good    Patient was instructed and signed consents on a low-fat diet and required supplementation at initial consult. Comments: Patient is able to verbalize diet concepts for bariatric surgery. Patient is aware diet concepts will be reinforced at 2-hour nutrition education consult. RD / LD will monitor progress.

## 2019-06-25 ENCOUNTER — TELEPHONE (OUTPATIENT)
Dept: ADMINISTRATIVE | Age: 19
End: 2019-06-25

## 2019-06-25 NOTE — TELEPHONE ENCOUNTER
Pt was referred by Dr. [place] for cardiac clearance prior to bariatric surgery (not yet scheduled). Pt is scheduled with Dr. Robert Esparza on 7/25/19 @ 10:00. Past cardiac hx form scanned.

## 2019-07-25 ENCOUNTER — OFFICE VISIT (OUTPATIENT)
Dept: CARDIOLOGY CLINIC | Age: 19
End: 2019-07-25
Payer: COMMERCIAL

## 2019-07-25 VITALS
DIASTOLIC BLOOD PRESSURE: 80 MMHG | SYSTOLIC BLOOD PRESSURE: 138 MMHG | HEIGHT: 68 IN | HEART RATE: 73 BPM | WEIGHT: 293 LBS | BODY MASS INDEX: 44.41 KG/M2

## 2019-07-25 DIAGNOSIS — Z01.810 PREOPERATIVE CARDIOVASCULAR EXAMINATION: ICD-10-CM

## 2019-07-25 DIAGNOSIS — E66.01 MORBID OBESITY (HCC): Primary | ICD-10-CM

## 2019-07-25 PROCEDURE — 93000 ELECTROCARDIOGRAM COMPLETE: CPT | Performed by: INTERNAL MEDICINE

## 2019-07-25 PROCEDURE — 99243 OFF/OP CNSLTJ NEW/EST LOW 30: CPT | Performed by: INTERNAL MEDICINE

## 2019-07-25 PROCEDURE — G8417 CALC BMI ABV UP PARAM F/U: HCPCS | Performed by: INTERNAL MEDICINE

## 2019-07-25 PROCEDURE — G8427 DOCREV CUR MEDS BY ELIG CLIN: HCPCS | Performed by: INTERNAL MEDICINE

## 2019-07-25 RX ORDER — AMOXICILLIN 250 MG/1
CAPSULE ORAL
Refills: 0 | COMMUNITY
Start: 2019-07-09 | End: 2020-06-04

## 2019-07-30 ENCOUNTER — INITIAL CONSULT (OUTPATIENT)
Dept: BARIATRICS/WEIGHT MGMT | Age: 19
End: 2019-07-30
Payer: COMMERCIAL

## 2019-07-30 VITALS — BODY MASS INDEX: 49.72 KG/M2 | WEIGHT: 293 LBS

## 2019-07-30 DIAGNOSIS — Z71.3 DIETARY COUNSELING: Primary | ICD-10-CM

## 2019-07-30 PROCEDURE — 99999 PR OFFICE/OUTPT VISIT,PROCEDURE ONLY: CPT

## 2019-07-30 NOTE — PROGRESS NOTES
contains calories      Laparoscopic Sleeve Gastectomy patients will be required to take Vitamin B12 500 mcgs 1 tablet daily in addition to the supplements listed above      Suggested Post-Operative Vitamin Supplementation Information:   A high-potency vitamin containing at least 200% of the daily value for at least 2/3 of the nutrients.  Begin with chewable or liquid vitamins for the first three months.  Caution liquid vitamins containing iron can stain teeth.  Avoid time-released supplements.  Avoid gel capsules.  Avoid enteric coating.  Choose a complete formula with at least 18 mg of iron, 370PG of folic acid, and contains selenium and zinc in each serving.  Avoid childrens formulas that are incomplete.  Taking vitamins close to food intake may improve gastrointestinal tolerance.  Do separate your dosage of vitamins when taking throughout the day.  Do not mix multivitamin containing iron with calcium supplements, take at least 2 hours apart. Suggested Post-Operative Calcium Supplementation Information:  Calcium supplements are needed due to the great amount of malabsorption that occurs after surgery which, can lead to developing osteoporosis or osteopenia within the first 2 years post-op. A calcium supplement will help maintain bone strength, help your heart pump correctly and aid in the repair of soft tissue. Calcium supplementation is needed lifelong. It is best to always take one dose right before bedtime because calcium is absorbed better when at rest, and another dose around 10:00am and another does around 3:00pm.     ? If you have a predisposition to kidney stones, please talk with your dietitian. Calcium Citrate should block kidney stone formation so this should not be a problem after surgery. If you do not take the correct form of calcium or take the wrong calcium you are more likely to develop kidney stones.     ? Make sure your calcium supplement contains Vitamin D3 at least lifestyle changes. (All serving sizes are 1 scoop, 1 can or 1 packet)  Product Source Cost  (approx.) Calories Fat  (grams) Protein  (grams) Sugars  (grams)              Ad Murrayer - Whey Protein         [employer] $45.00 110 0 25 0   Elizabeth Kilpatrick - Egg Protein [employer] $25.00 120 0 24 0   Iso Pure Powder [employer] / [employer] $ 40.00 100-105 0 25 0   Nectar Protein [employer] $ 30.00 80 0 23 0   BeneProtein [address] $ 42.00     case of 6 25 0 6 0   ProCel and UpCal D  Plus Phone order  [phone] $ 50.00  case of 6 28 0 5 0-1     Bariatric Advantage [phone]   $50.00 150 1.5 - 2.0 27 .5   Bariatric Fusion [phone] $35.00 138 0 27 <1   WheyBolic Extreme 60  [employer] $ 45.00 90 0.5 20 0.5   [employer] $35.00 138 0 27 <1   When choosing a supplement: Do not consume Ensure, Glycerna, Boost or any other high-calorie nutrition supplement on the market. We recommend that the fat content of your protein drink be less than 2 grams of fat, less than 2 grams of sugar and that it be 200 or less calories per serving. We also recommend that you are able to consume enough of your supplement to get your daily protein intake of 60-80 grams. If you are having difficulty getting your protein or are not able to find a supplement, please call your dietitian at [phone].

## 2019-08-02 ENCOUNTER — OFFICE VISIT (OUTPATIENT)
Dept: BARIATRICS/WEIGHT MGMT | Age: 19
End: 2019-08-02
Payer: COMMERCIAL

## 2019-08-02 DIAGNOSIS — E61.1 IRON DEFICIENCY: ICD-10-CM

## 2019-08-02 DIAGNOSIS — E55.9 VITAMIN D DEFICIENCY: Primary | ICD-10-CM

## 2019-08-02 PROCEDURE — 1036F TOBACCO NON-USER: CPT | Performed by: INTERNAL MEDICINE

## 2019-08-02 PROCEDURE — G8428 CUR MEDS NOT DOCUMENT: HCPCS | Performed by: INTERNAL MEDICINE

## 2019-08-02 PROCEDURE — G8417 CALC BMI ABV UP PARAM F/U: HCPCS | Performed by: INTERNAL MEDICINE

## 2019-08-02 PROCEDURE — 99201 PR OFFICE OUTPATIENT NEW 10 MINUTES: CPT | Performed by: INTERNAL MEDICINE

## 2019-08-02 PROCEDURE — 99211 OFF/OP EST MAY X REQ PHY/QHP: CPT

## 2019-08-02 RX ORDER — CHOLECALCIFEROL (VITAMIN D3) 1250 MCG
CAPSULE ORAL
Qty: 18 CAPSULE | Refills: 0 | Status: SHIPPED
Start: 2019-08-02 | End: 2020-06-04

## 2019-08-02 RX ORDER — FERROUS SULFATE 325(65) MG
325 TABLET ORAL
Qty: 90 TABLET | Refills: 1 | Status: SHIPPED
Start: 2019-08-02 | End: 2020-06-04

## 2019-08-02 RX ORDER — ASCORBIC ACID 250 MG
TABLET,CHEWABLE ORAL
Qty: 90 TABLET | Refills: 1 | Status: SHIPPED
Start: 2019-08-02 | End: 2020-06-04

## 2019-08-02 NOTE — PROGRESS NOTES
Date of Encounter: 8/2/19    Chief Complaint: Low vitamin D and iron def    HPI:     Mikie Taylor presents to the clinic today for evaluation and treatment of a low vitamin D and iron levels. She is preparing for a bariatric procedure and needs her levels normalized prior to proceeding. Lab and treatment history are as follows:   Date     Vit D-OH level  Ferritin  Treatment prescribed    06/07/2019     7   7    None yet    PE:     There were no vitals taken for this visit. Pt is WN, WD, and in NAD    A/P:   1. Low Vitamin D - uncontrolled   Vitamin D3 50k IU three times each week for 6 weeks then take 5,000 IU daily    2. Iron Def - uncontrolled   Ferrous sulfate 325 mg once daily with a meal (take one 250 tablet of vitamin C with it)    2.   Follow up          Return to see me in 10 weeks          Repeat Vit D 25OH and ferritn levels 2-3 days prior to appt with me    Prisca Diggs MD  8/2/19

## 2019-08-05 ENCOUNTER — HOSPITAL ENCOUNTER (OUTPATIENT)
Age: 19
Discharge: HOME | End: 2019-08-05
Payer: COMMERCIAL

## 2019-08-05 LAB — FERRITIN: 7 NG/ML

## 2019-08-05 PROCEDURE — 36415 COLL VENOUS BLD VENIPUNCTURE: CPT

## 2019-08-05 PROCEDURE — 82728 ASSAY OF FERRITIN: CPT

## 2019-09-09 ENCOUNTER — TELEPHONE (OUTPATIENT)
Dept: BARIATRICS/WEIGHT MGMT | Age: 19
End: 2019-09-09

## 2019-09-09 NOTE — TELEPHONE ENCOUNTER
Rd / Ld called patient due to patient being a no show for his / her scheduled 3rddietary appointment on 8/27/19. Patient was not available. Rd / Ld left a message to have the patient call the [facility] to reschedule at [phone].

## 2020-01-08 ENCOUNTER — TELEPHONE (OUTPATIENT)
Dept: BARIATRICS/WEIGHT MGMT | Age: 20
End: 2020-01-08

## 2020-06-04 ENCOUNTER — HOSPITAL ENCOUNTER (EMERGENCY)
Age: 20
Discharge: HOME | End: 2020-06-04
Attending: EMERGENCY MEDICINE
Payer: COMMERCIAL

## 2020-06-04 VITALS
DIASTOLIC BLOOD PRESSURE: 77 MMHG | BODY MASS INDEX: 44.41 KG/M2 | OXYGEN SATURATION: 100 % | HEART RATE: 74 BPM | RESPIRATION RATE: 20 BRPM | SYSTOLIC BLOOD PRESSURE: 158 MMHG | TEMPERATURE: 98.9 F | WEIGHT: 293 LBS | HEIGHT: 68 IN

## 2020-06-04 LAB
EKG ATRIAL RATE: 77 BPM
EKG P AXIS: 32 DEGREES
EKG P-R INTERVAL: 176 MS
EKG Q-T INTERVAL: 362 MS
EKG QRS DURATION: 78 MS
EKG QTC CALCULATION (BAZETT): 409 MS
EKG R AXIS: 22 DEGREES
EKG T AXIS: 20 DEGREES
EKG VENTRICULAR RATE: 77 BPM

## 2020-06-04 PROCEDURE — 6360000002 HC RX W HCPCS: Performed by: EMERGENCY MEDICINE

## 2020-06-04 PROCEDURE — 99283 EMERGENCY DEPT VISIT LOW MDM: CPT

## 2020-06-04 PROCEDURE — 93005 ELECTROCARDIOGRAM TRACING: CPT | Performed by: EMERGENCY MEDICINE

## 2020-06-04 PROCEDURE — 6370000000 HC RX 637 (ALT 250 FOR IP): Performed by: EMERGENCY MEDICINE

## 2020-06-04 RX ORDER — DIPHENHYDRAMINE HCL 25 MG
50 TABLET ORAL ONCE
Status: COMPLETED | OUTPATIENT
Start: 2020-06-04 | End: 2020-06-04

## 2020-06-04 RX ORDER — DEXAMETHASONE SODIUM PHOSPHATE 10 MG/ML
10 INJECTION INTRAMUSCULAR; INTRAVENOUS ONCE
Status: COMPLETED | OUTPATIENT
Start: 2020-06-04 | End: 2020-06-04

## 2020-06-04 RX ADMIN — DIPHENHYDRAMINE HCL 50 MG: 25 TABLET ORAL at 12:42

## 2020-06-04 RX ADMIN — DEXAMETHASONE SODIUM PHOSPHATE 10 MG: 10 INJECTION INTRAMUSCULAR; INTRAVENOUS at 12:42

## 2020-06-04 NOTE — ED PROVIDER NOTES
49-year-old female presenting with concern that she is having allergic reaction. She was at work when the hives and redness of her skin began. She feels a discomfort to her chest and feels like she cannot breathe. [facility] she says that this occurs when she is in a hot environment as well. She was at work when it began today and it is dramatically improved at this point. Has not had any medications prior to arrival.  She is awake, alert, oriented x4 at this point. No family history on file. Past Surgical History:   Procedure Laterality Date    UPPER GASTROINTESTINAL ENDOSCOPY N/A 6/7/2019    EGD BIOPSY performed by Gabe Gamez MD at [facility] ENDOSCOPY       Review of Systems   Skin: Positive for rash. Allergic/Immunologic:        Concern for allergic reaction   All other systems reviewed and are negative. Physical Exam  Constitutional:       General: She is not in acute distress. Appearance: She is well-developed. HENT:      Head: Normocephalic and atraumatic. Comments: No stridor, no wheezing, no trouble swallowing, no edema in the mouth or throat or lips or tongue     Mouth/Throat:      Pharynx: No oropharyngeal exudate or posterior oropharyngeal erythema. Eyes:      Conjunctiva/sclera: Conjunctivae normal.      Pupils: Pupils are equal, round, and reactive to light. Neck:      Musculoskeletal: Normal range of motion. Thyroid: No thyromegaly. Cardiovascular:      Rate and Rhythm: Normal rate and regular rhythm. Pulmonary:      Effort: Pulmonary effort is normal. No respiratory distress. Breath sounds: Normal breath sounds. Abdominal:      General: There is no distension. Palpations: Abdomen is soft. Tenderness: There is no abdominal tenderness. There is no guarding or rebound. Musculoskeletal: Normal range of motion. General: No tenderness. Skin:     General: Skin is warm and dry. Findings: No erythema.       Comments: No hives, no blood pressure reading without diagnosis of hypertension        Disposition:  Patient's disposition: Discharge to home  Patient's condition is stable.             Tran Sena DO  06/04/20 140

## 2021-05-15 ENCOUNTER — HOSPITAL ENCOUNTER (OUTPATIENT)
Age: 21
Setting detail: OBSERVATION
Discharge: HOME | End: 2021-05-17
Attending: EMERGENCY MEDICINE | Admitting: SURGERY
Payer: COMMERCIAL

## 2021-05-15 DIAGNOSIS — K92.2 GASTROINTESTINAL HEMORRHAGE, UNSPECIFIED GASTROINTESTINAL HEMORRHAGE TYPE: ICD-10-CM

## 2021-05-15 DIAGNOSIS — D64.9 ANEMIA, UNSPECIFIED TYPE: ICD-10-CM

## 2021-05-15 DIAGNOSIS — K81.0 ACUTE CHOLECYSTITIS: Primary | ICD-10-CM

## 2021-05-15 DIAGNOSIS — E66.01 MORBID OBESITY (HCC): ICD-10-CM

## 2021-05-15 PROCEDURE — 96374 THER/PROPH/DIAG INJ IV PUSH: CPT

## 2021-05-15 PROCEDURE — 99285 EMERGENCY DEPT VISIT HI MDM: CPT

## 2021-05-15 PROCEDURE — 96375 TX/PRO/DX INJ NEW DRUG ADDON: CPT

## 2021-05-16 ENCOUNTER — APPOINTMENT (OUTPATIENT)
Dept: GENERAL RADIOLOGY | Age: 21
End: 2021-05-16
Payer: COMMERCIAL

## 2021-05-16 ENCOUNTER — ANESTHESIA (OUTPATIENT)
Dept: OPERATING ROOM | Age: 21
End: 2021-05-16
Payer: COMMERCIAL

## 2021-05-16 ENCOUNTER — APPOINTMENT (OUTPATIENT)
Dept: CT IMAGING | Age: 21
End: 2021-05-16
Payer: COMMERCIAL

## 2021-05-16 ENCOUNTER — ANESTHESIA EVENT (OUTPATIENT)
Dept: OPERATING ROOM | Age: 21
End: 2021-05-16
Payer: COMMERCIAL

## 2021-05-16 VITALS
DIASTOLIC BLOOD PRESSURE: 74 MMHG | RESPIRATION RATE: 1 BRPM | SYSTOLIC BLOOD PRESSURE: 120 MMHG | OXYGEN SATURATION: 76 %

## 2021-05-16 PROBLEM — K81.0 ACUTE CHOLECYSTITIS: Status: ACTIVE | Noted: 2021-05-16

## 2021-05-16 LAB
ALBUMIN SERPL-MCNC: 3.7 G/DL (ref 3.5–5.2)
ALP BLD-CCNC: 108 U/L (ref 35–104)
ALT SERPL-CCNC: 120 U/L (ref 0–32)
ANION GAP SERPL CALCULATED.3IONS-SCNC: 10 MMOL/L (ref 7–16)
ANISOCYTOSIS: ABNORMAL
AST SERPL-CCNC: 274 U/L (ref 0–31)
BACTERIA: NORMAL /HPF
BASOPHILS ABSOLUTE: 0 E9/L (ref 0–0.2)
BASOPHILS RELATIVE PERCENT: 0.3 % (ref 0–2)
BILIRUB SERPL-MCNC: 0.6 MG/DL (ref 0–1.2)
BILIRUBIN URINE: NEGATIVE
BLOOD, URINE: ABNORMAL
BUN BLDV-MCNC: 9 MG/DL (ref 6–20)
CALCIUM SERPL-MCNC: 8.9 MG/DL (ref 8.6–10.2)
CHLORIDE BLD-SCNC: 104 MMOL/L (ref 98–107)
CLARITY: ABNORMAL
CO2: 25 MMOL/L (ref 22–29)
COLOR: YELLOW
CREAT SERPL-MCNC: 1 MG/DL (ref 0.5–1)
EKG ATRIAL RATE: 97 BPM
EKG P AXIS: 30 DEGREES
EKG P-R INTERVAL: 184 MS
EKG Q-T INTERVAL: 360 MS
EKG QRS DURATION: 80 MS
EKG QTC CALCULATION (BAZETT): 457 MS
EKG R AXIS: 6 DEGREES
EKG T AXIS: 14 DEGREES
EKG VENTRICULAR RATE: 97 BPM
EOSINOPHILS ABSOLUTE: 0.2 E9/L (ref 0.05–0.5)
EOSINOPHILS RELATIVE PERCENT: 2.6 % (ref 0–6)
EPITHELIAL CELLS, UA: NORMAL /HPF
GFR AFRICAN AMERICAN: >60
GFR NON-AFRICAN AMERICAN: >60 ML/MIN/1.73
GLUCOSE BLD-MCNC: 118 MG/DL (ref 74–99)
GLUCOSE URINE: NEGATIVE MG/DL
HCG, URINE, POC: NEGATIVE
HCT VFR BLD CALC: 28.1 % (ref 34–48)
HEMOGLOBIN: 7.7 G/DL (ref 11.5–15.5)
HYPOCHROMIA: ABNORMAL
KETONES, URINE: NEGATIVE MG/DL
LEUKOCYTE ESTERASE, URINE: NEGATIVE
LIPASE: 27 U/L (ref 13–60)
LYMPHOCYTES ABSOLUTE: 1.79 E9/L (ref 1.5–4)
LYMPHOCYTES RELATIVE PERCENT: 22.8 % (ref 20–42)
Lab: NORMAL
MCH RBC QN AUTO: 18.9 PG (ref 26–35)
MCHC RBC AUTO-ENTMCNC: 27.4 % (ref 32–34.5)
MCV RBC AUTO: 69 FL (ref 80–99.9)
MONOCYTES ABSOLUTE: 0.31 E9/L (ref 0.1–0.95)
MONOCYTES RELATIVE PERCENT: 3.5 % (ref 2–12)
NEGATIVE QC PASS/FAIL: NORMAL
NEUTROPHILS ABSOLUTE: 5.54 E9/L (ref 1.8–7.3)
NEUTROPHILS RELATIVE PERCENT: 71.1 % (ref 43–80)
NITRITE, URINE: NEGATIVE
OVALOCYTES: ABNORMAL
PDW BLD-RTO: 18.3 FL (ref 11.5–15)
PH UA: 7 (ref 5–9)
PLATELET # BLD: 318 E9/L (ref 130–450)
PMV BLD AUTO: 10 FL (ref 7–12)
POIKILOCYTES: ABNORMAL
POLYCHROMASIA: ABNORMAL
POSITIVE QC PASS/FAIL: NORMAL
POTASSIUM REFLEX MAGNESIUM: 4.1 MMOL/L (ref 3.5–5)
PROTEIN UA: NEGATIVE MG/DL
RBC # BLD: 4.07 E12/L (ref 3.5–5.5)
RBC UA: >20 /HPF (ref 0–2)
SODIUM BLD-SCNC: 139 MMOL/L (ref 132–146)
SPECIFIC GRAVITY UA: 1.02 (ref 1–1.03)
TOTAL PROTEIN: 7.4 G/DL (ref 6.4–8.3)
TROPONIN: <0.01 NG/ML (ref 0–0.03)
UROBILINOGEN, URINE: 2 E.U./DL
WBC # BLD: 7.8 E9/L (ref 4.5–11.5)
WBC UA: NORMAL /HPF (ref 0–5)

## 2021-05-16 PROCEDURE — 2500000003 HC RX 250 WO HCPCS: Performed by: EMERGENCY MEDICINE

## 2021-05-16 PROCEDURE — 96374 THER/PROPH/DIAG INJ IV PUSH: CPT

## 2021-05-16 PROCEDURE — 2580000003 HC RX 258: Performed by: EMERGENCY MEDICINE

## 2021-05-16 PROCEDURE — 6360000002 HC RX W HCPCS

## 2021-05-16 PROCEDURE — 7100000001 HC PACU RECOVERY - ADDTL 15 MIN: Performed by: SURGERY

## 2021-05-16 PROCEDURE — 2500000003 HC RX 250 WO HCPCS: Performed by: SURGERY

## 2021-05-16 PROCEDURE — 74177 CT ABD & PELVIS W/CONTRAST: CPT

## 2021-05-16 PROCEDURE — 93010 ELECTROCARDIOGRAM REPORT: CPT | Performed by: INTERNAL MEDICINE

## 2021-05-16 PROCEDURE — 88304 TISSUE EXAM BY PATHOLOGIST: CPT

## 2021-05-16 PROCEDURE — 6360000004 HC RX CONTRAST MEDICATION: Performed by: RADIOLOGY

## 2021-05-16 PROCEDURE — 84484 ASSAY OF TROPONIN QUANT: CPT

## 2021-05-16 PROCEDURE — 6360000002 HC RX W HCPCS: Performed by: NURSE ANESTHETIST, CERTIFIED REGISTERED

## 2021-05-16 PROCEDURE — 3700000000 HC ANESTHESIA ATTENDED CARE: Performed by: SURGERY

## 2021-05-16 PROCEDURE — 7100000000 HC PACU RECOVERY - FIRST 15 MIN: Performed by: SURGERY

## 2021-05-16 PROCEDURE — 83690 ASSAY OF LIPASE: CPT

## 2021-05-16 PROCEDURE — 3600000004 HC SURGERY LEVEL 4 BASE: Performed by: SURGERY

## 2021-05-16 PROCEDURE — 2720000010 HC SURG SUPPLY STERILE: Performed by: SURGERY

## 2021-05-16 PROCEDURE — 2580000003 HC RX 258: Performed by: NURSE ANESTHETIST, CERTIFIED REGISTERED

## 2021-05-16 PROCEDURE — 80053 COMPREHEN METABOLIC PANEL: CPT

## 2021-05-16 PROCEDURE — 6360000002 HC RX W HCPCS: Performed by: EMERGENCY MEDICINE

## 2021-05-16 PROCEDURE — 81001 URINALYSIS AUTO W/SCOPE: CPT

## 2021-05-16 PROCEDURE — 3700000001 HC ADD 15 MINUTES (ANESTHESIA): Performed by: SURGERY

## 2021-05-16 PROCEDURE — 6370000000 HC RX 637 (ALT 250 FOR IP): Performed by: EMERGENCY MEDICINE

## 2021-05-16 PROCEDURE — G0378 HOSPITAL OBSERVATION PER HR: HCPCS

## 2021-05-16 PROCEDURE — 93005 ELECTROCARDIOGRAM TRACING: CPT | Performed by: EMERGENCY MEDICINE

## 2021-05-16 PROCEDURE — 99220 PR INITIAL OBSERVATION CARE/DAY 70 MINUTES: CPT | Performed by: SURGERY

## 2021-05-16 PROCEDURE — 71046 X-RAY EXAM CHEST 2 VIEWS: CPT

## 2021-05-16 PROCEDURE — 6370000000 HC RX 637 (ALT 250 FOR IP): Performed by: SURGERY

## 2021-05-16 PROCEDURE — 2500000003 HC RX 250 WO HCPCS: Performed by: NURSE ANESTHETIST, CERTIFIED REGISTERED

## 2021-05-16 PROCEDURE — 85025 COMPLETE CBC W/AUTO DIFF WBC: CPT

## 2021-05-16 PROCEDURE — 2709999900 HC NON-CHARGEABLE SUPPLY: Performed by: SURGERY

## 2021-05-16 PROCEDURE — 3600000014 HC SURGERY LEVEL 4 ADDTL 15MIN: Performed by: SURGERY

## 2021-05-16 PROCEDURE — 96375 TX/PRO/DX INJ NEW DRUG ADDON: CPT

## 2021-05-16 PROCEDURE — 47562 LAPAROSCOPIC CHOLECYSTECTOMY: CPT | Performed by: SURGERY

## 2021-05-16 RX ORDER — DEXAMETHASONE SODIUM PHOSPHATE 10 MG/ML
INJECTION, SOLUTION INTRAMUSCULAR; INTRAVENOUS PRN
Status: DISCONTINUED | OUTPATIENT
Start: 2021-05-16 | End: 2021-05-16 | Stop reason: SDUPTHER

## 2021-05-16 RX ORDER — NEOSTIGMINE METHYLSULFATE 1 MG/ML
INJECTION, SOLUTION INTRAVENOUS PRN
Status: DISCONTINUED | OUTPATIENT
Start: 2021-05-16 | End: 2021-05-16 | Stop reason: SDUPTHER

## 2021-05-16 RX ORDER — MORPHINE SULFATE 2 MG/ML
2 INJECTION, SOLUTION INTRAMUSCULAR; INTRAVENOUS
Status: DISCONTINUED | OUTPATIENT
Start: 2021-05-16 | End: 2021-05-17 | Stop reason: HOSPADM

## 2021-05-16 RX ORDER — ONDANSETRON 2 MG/ML
INJECTION INTRAMUSCULAR; INTRAVENOUS PRN
Status: DISCONTINUED | OUTPATIENT
Start: 2021-05-16 | End: 2021-05-16 | Stop reason: SDUPTHER

## 2021-05-16 RX ORDER — FENTANYL CITRATE 50 UG/ML
INJECTION, SOLUTION INTRAMUSCULAR; INTRAVENOUS PRN
Status: DISCONTINUED | OUTPATIENT
Start: 2021-05-16 | End: 2021-05-16 | Stop reason: SDUPTHER

## 2021-05-16 RX ORDER — PROPOFOL 10 MG/ML
INJECTION, EMULSION INTRAVENOUS PRN
Status: DISCONTINUED | OUTPATIENT
Start: 2021-05-16 | End: 2021-05-16 | Stop reason: SDUPTHER

## 2021-05-16 RX ORDER — FENTANYL CITRATE 50 UG/ML
50 INJECTION, SOLUTION INTRAMUSCULAR; INTRAVENOUS ONCE
Status: COMPLETED | OUTPATIENT
Start: 2021-05-16 | End: 2021-05-16

## 2021-05-16 RX ORDER — SODIUM CHLORIDE, SODIUM LACTATE, POTASSIUM CHLORIDE, CALCIUM CHLORIDE 600; 310; 30; 20 MG/100ML; MG/100ML; MG/100ML; MG/100ML
INJECTION, SOLUTION INTRAVENOUS CONTINUOUS PRN
Status: DISCONTINUED | OUTPATIENT
Start: 2021-05-16 | End: 2021-05-16 | Stop reason: SDUPTHER

## 2021-05-16 RX ORDER — FENTANYL CITRATE 50 UG/ML
25 INJECTION, SOLUTION INTRAMUSCULAR; INTRAVENOUS EVERY 5 MIN PRN
Status: DISCONTINUED | OUTPATIENT
Start: 2021-05-16 | End: 2021-05-16 | Stop reason: HOSPADM

## 2021-05-16 RX ORDER — FENTANYL CITRATE 50 UG/ML
50 INJECTION, SOLUTION INTRAMUSCULAR; INTRAVENOUS EVERY 5 MIN PRN
Status: DISCONTINUED | OUTPATIENT
Start: 2021-05-16 | End: 2021-05-16 | Stop reason: HOSPADM

## 2021-05-16 RX ORDER — SODIUM CHLORIDE 9 MG/ML
25 INJECTION, SOLUTION INTRAVENOUS PRN
Status: DISCONTINUED | OUTPATIENT
Start: 2021-05-16 | End: 2021-05-17 | Stop reason: HOSPADM

## 2021-05-16 RX ORDER — GLYCOPYRROLATE 1 MG/5 ML
SYRINGE (ML) INTRAVENOUS PRN
Status: DISCONTINUED | OUTPATIENT
Start: 2021-05-16 | End: 2021-05-16 | Stop reason: SDUPTHER

## 2021-05-16 RX ORDER — ROCURONIUM BROMIDE 10 MG/ML
INJECTION, SOLUTION INTRAVENOUS PRN
Status: DISCONTINUED | OUTPATIENT
Start: 2021-05-16 | End: 2021-05-16 | Stop reason: SDUPTHER

## 2021-05-16 RX ORDER — ONDANSETRON 2 MG/ML
4 INJECTION INTRAMUSCULAR; INTRAVENOUS
Status: DISCONTINUED | OUTPATIENT
Start: 2021-05-16 | End: 2021-05-16 | Stop reason: HOSPADM

## 2021-05-16 RX ORDER — 0.9 % SODIUM CHLORIDE 0.9 %
1000 INTRAVENOUS SOLUTION INTRAVENOUS ONCE
Status: COMPLETED | OUTPATIENT
Start: 2021-05-16 | End: 2021-05-16

## 2021-05-16 RX ORDER — HYDROCODONE BITARTRATE AND ACETAMINOPHEN 5; 325 MG/1; MG/1
1 TABLET ORAL EVERY 4 HOURS PRN
Status: DISCONTINUED | OUTPATIENT
Start: 2021-05-16 | End: 2021-05-17 | Stop reason: HOSPADM

## 2021-05-16 RX ORDER — LIDOCAINE HYDROCHLORIDE 20 MG/ML
INJECTION, SOLUTION INTRAVENOUS PRN
Status: DISCONTINUED | OUTPATIENT
Start: 2021-05-16 | End: 2021-05-16 | Stop reason: SDUPTHER

## 2021-05-16 RX ORDER — SODIUM CHLORIDE 0.9 % (FLUSH) 0.9 %
5-40 SYRINGE (ML) INJECTION PRN
Status: DISCONTINUED | OUTPATIENT
Start: 2021-05-16 | End: 2021-05-17 | Stop reason: HOSPADM

## 2021-05-16 RX ORDER — MEPERIDINE HYDROCHLORIDE 25 MG/ML
25 INJECTION INTRAMUSCULAR; INTRAVENOUS; SUBCUTANEOUS EVERY 5 MIN PRN
Status: DISCONTINUED | OUTPATIENT
Start: 2021-05-16 | End: 2021-05-16 | Stop reason: HOSPADM

## 2021-05-16 RX ORDER — BUPIVACAINE HYDROCHLORIDE AND EPINEPHRINE 2.5; 5 MG/ML; UG/ML
INJECTION, SOLUTION EPIDURAL; INFILTRATION; INTRACAUDAL; PERINEURAL PRN
Status: DISCONTINUED | OUTPATIENT
Start: 2021-05-16 | End: 2021-05-16 | Stop reason: ALTCHOICE

## 2021-05-16 RX ORDER — HYDROCODONE BITARTRATE AND ACETAMINOPHEN 5; 325 MG/1; MG/1
2 TABLET ORAL EVERY 4 HOURS PRN
Status: DISCONTINUED | OUTPATIENT
Start: 2021-05-16 | End: 2021-05-17 | Stop reason: HOSPADM

## 2021-05-16 RX ORDER — MIDAZOLAM HYDROCHLORIDE 1 MG/ML
INJECTION INTRAMUSCULAR; INTRAVENOUS PRN
Status: DISCONTINUED | OUTPATIENT
Start: 2021-05-16 | End: 2021-05-16 | Stop reason: SDUPTHER

## 2021-05-16 RX ORDER — SODIUM CHLORIDE 0.9 % (FLUSH) 0.9 %
5-40 SYRINGE (ML) INJECTION EVERY 12 HOURS SCHEDULED
Status: DISCONTINUED | OUTPATIENT
Start: 2021-05-16 | End: 2021-05-17 | Stop reason: HOSPADM

## 2021-05-16 RX ADMIN — IOPAMIDOL 75 ML: 755 INJECTION, SOLUTION INTRAVENOUS at 02:02

## 2021-05-16 RX ADMIN — HYDROMORPHONE HYDROCHLORIDE 0.5 MG: 1 INJECTION, SOLUTION INTRAMUSCULAR; INTRAVENOUS; SUBCUTANEOUS at 14:56

## 2021-05-16 RX ADMIN — LIDOCAINE HYDROCHLORIDE 100 MG: 20 INJECTION, SOLUTION INTRAVENOUS at 13:36

## 2021-05-16 RX ADMIN — SODIUM CHLORIDE 1000 ML: 9 INJECTION, SOLUTION INTRAVENOUS at 00:31

## 2021-05-16 RX ADMIN — SODIUM CHLORIDE, POTASSIUM CHLORIDE, SODIUM LACTATE AND CALCIUM CHLORIDE: 600; 310; 30; 20 INJECTION, SOLUTION INTRAVENOUS at 13:32

## 2021-05-16 RX ADMIN — Medication 0.5 MG: at 14:56

## 2021-05-16 RX ADMIN — MIDAZOLAM 2 MG: 1 INJECTION INTRAMUSCULAR; INTRAVENOUS at 13:32

## 2021-05-16 RX ADMIN — Medication 0.5 MG: at 14:43

## 2021-05-16 RX ADMIN — FENTANYL CITRATE 50 MCG: 50 INJECTION, SOLUTION INTRAMUSCULAR; INTRAVENOUS at 01:46

## 2021-05-16 RX ADMIN — HYDROMORPHONE HYDROCHLORIDE 0.5 MG: 1 INJECTION, SOLUTION INTRAMUSCULAR; INTRAVENOUS; SUBCUTANEOUS at 15:11

## 2021-05-16 RX ADMIN — DEXAMETHASONE SODIUM PHOSPHATE 10 MG: 10 INJECTION, SOLUTION INTRAMUSCULAR; INTRAVENOUS at 13:42

## 2021-05-16 RX ADMIN — FAMOTIDINE 20 MG: 10 INJECTION, SOLUTION INTRAVENOUS at 00:32

## 2021-05-16 RX ADMIN — Medication 0.6 MG: at 14:00

## 2021-05-16 RX ADMIN — Medication 3 MG: at 14:00

## 2021-05-16 RX ADMIN — Medication 0.5 MG: at 15:11

## 2021-05-16 RX ADMIN — LIDOCAINE HYDROCHLORIDE: 20 SOLUTION ORAL; TOPICAL at 00:32

## 2021-05-16 RX ADMIN — HYDROCODONE BITARTRATE AND ACETAMINOPHEN 2 TABLET: 5; 325 TABLET ORAL at 20:35

## 2021-05-16 RX ADMIN — FENTANYL CITRATE 150 MCG: 50 INJECTION, SOLUTION INTRAMUSCULAR; INTRAVENOUS at 13:36

## 2021-05-16 RX ADMIN — HYDROMORPHONE HYDROCHLORIDE 0.5 MG: 1 INJECTION, SOLUTION INTRAMUSCULAR; INTRAVENOUS; SUBCUTANEOUS at 14:43

## 2021-05-16 RX ADMIN — PROPOFOL 200 MG: 10 INJECTION, EMULSION INTRAVENOUS at 13:36

## 2021-05-16 RX ADMIN — CEFTRIAXONE SODIUM 1000 MG: 1 INJECTION, POWDER, FOR SOLUTION INTRAMUSCULAR; INTRAVENOUS at 02:56

## 2021-05-16 RX ADMIN — ROCURONIUM BROMIDE 50 MG: 10 SOLUTION INTRAVENOUS at 13:36

## 2021-05-16 RX ADMIN — HYDROCODONE BITARTRATE AND ACETAMINOPHEN 2 TABLET: 5; 325 TABLET ORAL at 16:37

## 2021-05-16 RX ADMIN — METRONIDAZOLE 500 MG: 500 INJECTION, SOLUTION INTRAVENOUS at 02:56

## 2021-05-16 RX ADMIN — ONDANSETRON 4 MG: 2 INJECTION INTRAMUSCULAR; INTRAVENOUS at 14:00

## 2021-05-16 RX ADMIN — FENTANYL CITRATE 50 MCG: 50 INJECTION, SOLUTION INTRAMUSCULAR; INTRAVENOUS at 02:56

## 2021-05-16 RX ADMIN — FENTANYL CITRATE 100 MCG: 50 INJECTION, SOLUTION INTRAMUSCULAR; INTRAVENOUS at 13:56

## 2021-05-16 ASSESSMENT — PAIN DESCRIPTION - PROGRESSION
CLINICAL_PROGRESSION: NOT CHANGED
CLINICAL_PROGRESSION: GRADUALLY IMPROVING

## 2021-05-16 ASSESSMENT — PULMONARY FUNCTION TESTS
PIF_VALUE: 27
PIF_VALUE: 0
PIF_VALUE: 23
PIF_VALUE: 26
PIF_VALUE: 0
PIF_VALUE: 30
PIF_VALUE: 27
PIF_VALUE: 0
PIF_VALUE: 0
PIF_VALUE: 27
PIF_VALUE: 19
PIF_VALUE: 21
PIF_VALUE: 23
PIF_VALUE: 27
PIF_VALUE: 0
PIF_VALUE: 27
PIF_VALUE: 1
PIF_VALUE: 26
PIF_VALUE: 1
PIF_VALUE: 2

## 2021-05-16 ASSESSMENT — PAIN DESCRIPTION - FREQUENCY
FREQUENCY: INTERMITTENT

## 2021-05-16 ASSESSMENT — PAIN SCALES - GENERAL
PAINLEVEL_OUTOF10: 8
PAINLEVEL_OUTOF10: 10
PAINLEVEL_OUTOF10: 9
PAINLEVEL_OUTOF10: 8
PAINLEVEL_OUTOF10: 0
PAINLEVEL_OUTOF10: 8
PAINLEVEL_OUTOF10: 9
PAINLEVEL_OUTOF10: 0
PAINLEVEL_OUTOF10: 3

## 2021-05-16 ASSESSMENT — PAIN - FUNCTIONAL ASSESSMENT
PAIN_FUNCTIONAL_ASSESSMENT: ACTIVITIES ARE NOT PREVENTED
PAIN_FUNCTIONAL_ASSESSMENT: PREVENTS OR INTERFERES SOME ACTIVE ACTIVITIES AND ADLS
PAIN_FUNCTIONAL_ASSESSMENT: ACTIVITIES ARE NOT PREVENTED
PAIN_FUNCTIONAL_ASSESSMENT: PREVENTS OR INTERFERES SOME ACTIVE ACTIVITIES AND ADLS

## 2021-05-16 ASSESSMENT — PAIN DESCRIPTION - ONSET
ONSET: PROGRESSIVE
ONSET: AWAKENED FROM SLEEP
ONSET: AWAKENED FROM SLEEP

## 2021-05-16 ASSESSMENT — PAIN DESCRIPTION - LOCATION
LOCATION: ABDOMEN
LOCATION: ABDOMEN
LOCATION: BACK;CHEST

## 2021-05-16 ASSESSMENT — PAIN DESCRIPTION - DESCRIPTORS
DESCRIPTORS: DISCOMFORT;SORE
DESCRIPTORS: ACHING
DESCRIPTORS: ACHING;DISCOMFORT

## 2021-05-16 ASSESSMENT — PAIN DESCRIPTION - ORIENTATION
ORIENTATION: MID
ORIENTATION: MID

## 2021-05-16 ASSESSMENT — PAIN DESCRIPTION - PAIN TYPE
TYPE: ACUTE PAIN
TYPE: ACUTE PAIN

## 2021-05-16 NOTE — ANESTHESIA POSTPROCEDURE EVALUATION
Department of Anesthesiology  Postprocedure Note    Patient: Nilsa Cook  MRN: [id number]  YOB: 2000  Date of evaluation: 5/16/2021  Time:  2:16 PM     Procedure Summary     Date: 05/16/21 Room / Location: [address] [place] [address] [facility]    Anesthesia Start: 4130 Anesthesia Stop: 1414    Procedure: CHOLECYSTECTOMY LAPAROSCOPIC (N/A Abdomen) Diagnosis: (ACUTE CHOLYSISTITS)    Surgeons: Marquez Rincon MD Responsible Provider: Jamaal Lorenzo MD    Anesthesia Type: general ASA Status: 3          Anesthesia Type: general    Ulises Phase I:      Ulises Phase II:      Last vitals: Reviewed and per EMR flowsheets.        Anesthesia Post Evaluation    Patient location during evaluation: PACU  Patient participation: complete - patient participated  Level of consciousness: awake  Pain score: 4  Airway patency: patent  Nausea & Vomiting: no nausea  Complications: no  Cardiovascular status: blood pressure returned to baseline  Respiratory status: acceptable  Hydration status: euvolemic

## 2021-05-16 NOTE — OP NOTE
DATE OF PROCEDURE:  5/16/2021      SURGEON:  Susie Hurtado M.D.      ASSISTANT:  none      PREOPERATIVE DIAGNOSIS:  Acute cholecystitis. POSTOPERATIVE DIAGNOSIS:  same      OPERATION:  Laparoscopic cholecystectomy. ANESTHESIA:  General.      ESTIMATED BLOOD LOSS:minimal      COMPLICATIONS:  None. FLUIDS:  Crystalloid. SPECIMEN:  Gallbladder. DISPOSITION:  To  floor. INDICATION:  Dwayne Gomez is a 23 y.o. female who presented     for evaluation of right upper quadrant abdominal pain consistent with  itis. We explained the risks, benefits, potential outcomes, and   alternatives of treatment to the aforementioned procedure, including risk of   potential biliary leak or bile duct injury requiring further surgeries, infection or   bleeding requiring procedure or surgeries. she agreed to proceed   understanding those risks and potential outcomes. PROCEDURE:  The patient was brought into the operative suite and was given   preoperative antibiotics 30 minutes before incision, was placed under general   anesthesia, and then was prepped and draped in normal sterile condition. Once this was done, a 5-mm incision was made supraumbilically and a Veress   needle was passed through this incision into the peritoneum. Once this was done, CO2 was used to insufflate the abdomen to a pressure of 15 mmHg. At that time, the Veress needle was removed and replaced with a 5-mm trocar. The laparoscope was placed through that trocar and port, and once this was done, under direct visualization with   the laparoscope, an additional  10-mm port was placed in the subxiphoid area. Two right lateral abdominal ports were placed, 5 mm in size, under direct   visualization with the laparoscope. Once this was done, the gallbladder was retracted cephaladly with blunt   graspers. Blunt dissection as well as electrocautery were able to free the   gallbladder and expose the cystic duct and artery.   These were taken with   ligamax clip appliers, 2 distally and 1 proximally and then ligated with   Endoshears. Electrocautery then was able to remove the gallbladder from   liver bed. Any bleeding was electrocauterized for hemostasis. The abdomen   was  then suction irrigated until the aspirate returned clear and the   gallbladder was removed previous to this with the bag. It was sent for   specimen at that time. The 10-mm abdominal incision and defect in the fascia was closed in a   figure-of-8 fashion with 0 Vicryl suture. Once this was done, the skin was   approximated with 4-0 Monocryl suture in a subcuticular fashion. The patient   was then woken up in stable and taken to PACU.     Lakshmi Mcintosh MD  2:03 PM  5/16/2021

## 2021-05-16 NOTE — ANESTHESIA PRE PROCEDURE
Drug/Infectious Status (If Applicable):  No results found for: HIV, HEPCAB    COVID-19 Screening (If Applicable): No results found for: COVID19        Anesthesia Evaluation    Airway: Mallampati: III  TM distance: >3 FB   Neck ROM: full  Mouth opening: > = 3 FB Dental:          Pulmonary:Negative Pulmonary ROS breath sounds clear to auscultation                             Cardiovascular:    (+) pulmonary hypertension:,         Rhythm: regular  Rate: normal                    Neuro/Psych:   (+) neuromuscular disease:, headaches:,             GI/Hepatic/Renal:   (+) GERD:, morbid obesity          Endo/Other: Negative Endo/Other ROS                    Abdominal:   (+) obese,     Abdomen: soft. Vascular:                                        Anesthesia Plan      general     ASA 3             Anesthetic plan and risks discussed with patient. Plan discussed with CRNA.                   Josefa Ochoa MD   5/16/2021

## 2021-05-16 NOTE — ED PROVIDER NOTES
HPI:  5/16/21, Time: 12:02 AM EDT         Nay Cook is a 23 y.o. female presenting to the ED for RUQ, epigastric, LUQ abdominal pain, beginning 4 hours ago. The complaint has been persistent, moderate in severity, and worsened by nothing. Patient describes her pain as a sharp and stabbing sensation located in the upper abdomen. No radiation of the pain. No alleviating or exacerbating factors. Patient did not take any medication prior to arrival.  She denies any associated nausea, vomiting, diarrhea, constipation, dysuria, hematuria. She is currently on her menstrual cycle. Patient denies any medical problems. She denies any surgeries on her abdomen. ROS:   Pertinent positives and negatives are stated within HPI, all other systems reviewed and are negative.  --------------------------------------------- PAST HISTORY ---------------------------------------------  Past Medical History:  has a past medical history of Headache, Iron deficiency, Sciatica, and Vitamin D deficiency. Past Surgical History:  has a past surgical history that includes Upper gastrointestinal endoscopy (N/A, 6/7/2019). Social History:  reports that she has never smoked. She has never used smokeless tobacco. She reports current alcohol use. She reports that she does not use drugs. Family History: family history is not on file. The patients home medications have been reviewed. Allergies: Latex and Other    ---------------------------------------------------PHYSICAL EXAM--------------------------------------    Constitutional/General: Alert and oriented x3, well appearing, non toxic in NAD  Head: Normocephalic and atraumatic  Eyes: PERRL, EOMI  Mouth: Oropharynx clear, handling secretions, no trismus  Neck: Supple, full ROM, non tender to palpation in the midline, no stridor, no crepitus, no meningeal signs  Pulmonary: Lungs clear to auscultation bilaterally, no wheezes, rales, or rhonchi.  Not in respiratory distress  Cardiovascular:  Regular rate. Regular rhythm. No murmurs, gallops, or rubs. 2+ distal pulses  Chest: no chest wall tenderness  Abdomen: Soft. Obese. Right upper quadrant, epigastric, left upper quadrant tenderness to palpation. +BS. No rebound, guarding, or rigidity. No pulsatile masses appreciated. Musculoskeletal: Moves all extremities x 4. Warm and well perfused, no clubbing, cyanosis, or edema. Capillary refill <3 seconds  Skin: warm and dry. No rashes. Neurologic: GCS 15, CN 2-12 grossly intact, no focal deficits, symmetric strength 5/5 in the upper and lower extremities bilaterally  Psych: Normal Affect    -------------------------------------------------- RESULTS -------------------------------------------------  I have personally reviewed all laboratory and imaging results for this patient. Results are listed below.      LABS:  Results for orders placed or performed during the hospital encounter of 05/15/21   CBC Auto Differential   Result Value Ref Range    WBC 7.8 4.5 - 11.5 E9/L    RBC 4.07 3.50 - 5.50 E12/L    Hemoglobin 7.7 (L) 11.5 - 15.5 g/dL    Hematocrit 28.1 (L) 34.0 - 48.0 %    MCV 69.0 (L) 80.0 - 99.9 fL    MCH 18.9 (L) 26.0 - 35.0 pg    MCHC 27.4 (L) 32.0 - 34.5 %    RDW 18.3 (H) 11.5 - 15.0 fL    Platelets 581 428 - 433 E9/L    MPV 10.0 7.0 - 12.0 fL    Neutrophils % 71.1 43.0 - 80.0 %    Lymphocytes % 22.8 20.0 - 42.0 %    Monocytes % 3.5 2.0 - 12.0 %    Eosinophils % 2.6 0.0 - 6.0 %    Basophils % 0.3 0.0 - 2.0 %    Neutrophils Absolute 5.54 1.80 - 7.30 E9/L    Lymphocytes Absolute 1.79 1.50 - 4.00 E9/L    Monocytes Absolute 0.31 0.10 - 0.95 E9/L    Eosinophils Absolute 0.20 0.05 - 0.50 E9/L    Basophils Absolute 0.00 0.00 - 0.20 E9/L    Anisocytosis 2+     Polychromasia 1+     Hypochromia 2+     Poikilocytes 1+     Ovalocytes 1+    Comprehensive Metabolic Panel w/ Reflex to MG   Result Value Ref Range    Sodium 139 132 - 146 mmol/L    Potassium reflex Magnesium 4.1 3.5 - 5.0 mmol/L    Chloride 104 98 - 107 mmol/L    CO2 25 22 - 29 mmol/L    Anion Gap 10 7 - 16 mmol/L    Glucose 118 (H) 74 - 99 mg/dL    BUN 9 6 - 20 mg/dL    CREATININE 1.0 0.5 - 1.0 mg/dL    GFR Non-African American >60 >=60 mL/min/1.73    GFR African American >60     Calcium 8.9 8.6 - 10.2 mg/dL    Total Protein 7.4 6.4 - 8.3 g/dL    Albumin 3.7 3.5 - 5.2 g/dL    Total Bilirubin 0.6 0.0 - 1.2 mg/dL    Alkaline Phosphatase 108 (H) 35 - 104 U/L     (H) 0 - 32 U/L     (H) 0 - 31 U/L   Troponin   Result Value Ref Range    Troponin <0.01 0.00 - 0.03 ng/mL   Lipase   Result Value Ref Range    Lipase 27 13 - 60 U/L   Urinalysis, reflex to microscopic   Result Value Ref Range    Color, UA Yellow Straw/Yellow    Clarity, UA TURBID (A) Clear    Glucose, Ur Negative Negative mg/dL    Bilirubin Urine Negative Negative    Ketones, Urine Negative Negative mg/dL    Specific Gravity, UA 1.020 1.005 - 1.030    Blood, Urine LARGE (A) Negative    pH, UA 7.0 5.0 - 9.0    Protein, UA Negative Negative mg/dL    Urobilinogen, Urine 2.0 (A) <2.0 E.U./dL    Nitrite, Urine Negative Negative    Leukocyte Esterase, Urine Negative Negative   Microscopic Urinalysis   Result Value Ref Range    WBC, UA 1-3 0 - 5 /HPF    RBC, UA >20 0 - 2 /HPF    Epithelial Cells, UA RARE /HPF    Bacteria, UA NONE SEEN None Seen /HPF   POC Pregnancy Urine Qual   Result Value Ref Range    HCG, Urine, POC Negative Negative    Lot Number [id number]     Positive QC Pass/Fail Pass     Negative QC Pass/Fail Pass        RADIOLOGY:  Interpreted by Radiologist.  CT ABDOMEN PELVIS W IV CONTRAST Additional Contrast? None   Final Result   Distended gallbladder with questionable wall thickening and pericholecystic   stranding. Further evaluation with gallbladder ultrasound recommended. No other evidence of acute abdominal or pelvic pathology. XR CHEST (2 VW)   Final Result   No acute process.                EKG Interpretation  Interpreted by emergency tachycardia. Labs and imaging obtained. Patient's liver enzymes are elevated. Troponin is within normal limits. Patient's hemoglobin is 7.7. Patient states that she has a history of iron deficiency anemia. Rectal exam done. It was Hemoccult positive. CT of the abdomen pelvis was obtained. A distended gallbladder with questionable wall thickening and pericholecystic stranding was appreciated. I spoke with Dr. Noemí Elias. He is agreeable with admitting the patient. Would like Rocephin and Flagyl started. Patient is currently stable in the emergency department. Re-Evaluations:             Re-evaluation. Patients symptoms show no change      Consultations:             Dr. Noemí Elias is agreeable with admission. This patient's ED course included: a personal history and physicial examination, re-evaluation prior to disposition, multiple bedside re-evaluations, IV medications, cardiac monitoring, continuous pulse oximetry and a personal history and physicial eaxmination    This patient has remained hemodynamically stable during their ED course. Counseling: The emergency provider has spoken with the patient and discussed todays results, in addition to providing specific details for the plan of care and counseling regarding the diagnosis and prognosis. Questions are answered at this time and they are agreeable with the plan.       --------------------------------- IMPRESSION AND DISPOSITION ---------------------------------    IMPRESSION  1. Acute cholecystitis    2. Gastrointestinal hemorrhage, unspecified gastrointestinal hemorrhage type    3. Anemia, unspecified type        DISPOSITION  Disposition: Admit to telemetry  Patient condition is stable        NOTE: This report was transcribed using voice recognition software.  Every effort was made to ensure accuracy; however, inadvertent computerized transcription errors may be present          Husam Torres MD  05/16/21 8199

## 2021-05-16 NOTE — H&P
General Surgery History and Physical    Patient's Name/Date of Birth: Gaudencio Rodriguez / 2000    Date: May 16, 2021     Surgeon: Esther Lambert M.D.    PCP: JANINE Bear NP     Chief Complaint: acute cholecysititis    HPI:   Gaudencio Rodriguez is a 23 y.o. female who presents for evaluation of acute cholecystitis. Timing is constant, radiation to back, alleviated by nothing and started several hours ago      Past Medical History:   Diagnosis Date    Headache     Iron deficiency     Sciatica     Vitamin D deficiency        Past Surgical History:   Procedure Laterality Date    UPPER GASTROINTESTINAL ENDOSCOPY N/A 6/7/2019    EGD BIOPSY performed by Mary Pack MD at [facility] ENDOSCOPY       Current Facility-Administered Medications   Medication Dose Route Frequency Provider Last Rate Last Admin    sodium chloride flush 0.9 % injection 5-40 mL  5-40 mL Intravenous 2 times per day Mary Pack MD        sodium chloride flush 0.9 % injection 5-40 mL  5-40 mL Intravenous PRN aMry Pack MD        0.9 % sodium chloride infusion  25 mL Intravenous PRN Mary Pack MD        morphine (PF) injection 2 mg  2 mg Intravenous Q2H PRN Mary Pack MD           Allergies   Allergen Reactions    Latex Hives    Other      enviromental -- sun, grass       The patient has a family history that is negative for severe cardiovascular or respiratory issues, negative for reaction to anesthesia.     Social History     Socioeconomic History    Marital status: Single     Spouse name: Not on file    Number of children: Not on file    Years of education: Not on file    Highest education level: Not on file   Occupational History    Not on file   Tobacco Use    Smoking status: Never Smoker    Smokeless tobacco: Never Used   Vaping Use    Vaping Use: Never used   Substance and Sexual Activity    Alcohol use: Yes     Comment: socially    Drug use: No    Sexual activity: Not on file   Other Topics Concern

## 2021-05-17 VITALS
DIASTOLIC BLOOD PRESSURE: 87 MMHG | SYSTOLIC BLOOD PRESSURE: 147 MMHG | HEART RATE: 88 BPM | TEMPERATURE: 98.6 F | OXYGEN SATURATION: 96 % | RESPIRATION RATE: 22 BRPM | HEIGHT: 68 IN | BODY MASS INDEX: 47.14 KG/M2

## 2021-05-17 PROCEDURE — G0378 HOSPITAL OBSERVATION PER HR: HCPCS

## 2021-05-17 PROCEDURE — 6370000000 HC RX 637 (ALT 250 FOR IP): Performed by: SURGERY

## 2021-05-17 PROCEDURE — 99024 POSTOP FOLLOW-UP VISIT: CPT | Performed by: SURGERY

## 2021-05-17 PROCEDURE — 94761 N-INVAS EAR/PLS OXIMETRY MLT: CPT

## 2021-05-17 RX ORDER — DOCUSATE SODIUM 100 MG/1
100 CAPSULE, LIQUID FILLED ORAL 2 TIMES DAILY
Qty: 60 CAPSULE | Refills: 0 | Status: SHIPPED | OUTPATIENT
Start: 2021-05-17 | End: 2021-06-03 | Stop reason: ALTCHOICE

## 2021-05-17 RX ORDER — IBUPROFEN 800 MG/1
800 TABLET ORAL EVERY 6 HOURS PRN
Qty: 20 TABLET | Refills: 0 | Status: SHIPPED | OUTPATIENT
Start: 2021-05-17 | End: 2021-06-03 | Stop reason: ALTCHOICE

## 2021-05-17 RX ORDER — OXYCODONE HYDROCHLORIDE AND ACETAMINOPHEN 5; 325 MG/1; MG/1
1 TABLET ORAL EVERY 6 HOURS PRN
Qty: 20 TABLET | Refills: 0 | Status: SHIPPED | OUTPATIENT
Start: 2021-05-17 | End: 2021-05-22

## 2021-05-17 RX ADMIN — HYDROCODONE BITARTRATE AND ACETAMINOPHEN 2 TABLET: 5; 325 TABLET ORAL at 08:53

## 2021-05-17 ASSESSMENT — PAIN DESCRIPTION - DESCRIPTORS: DESCRIPTORS: ACHING;DISCOMFORT;SORE

## 2021-05-17 ASSESSMENT — PAIN DESCRIPTION - LOCATION: LOCATION: ABDOMEN

## 2021-05-17 ASSESSMENT — PAIN DESCRIPTION - PAIN TYPE: TYPE: SURGICAL PAIN

## 2021-05-17 NOTE — PLAN OF CARE
Problem: HEMODYNAMIC STATUS  Goal: Patient has stable vital signs and fluid balance  5/17/2021 0953 by Homer Franks RN  Outcome: Met This Shift     Problem: OXYGENATION/RESPIRATORY FUNCTION  Goal: Patient will achieve/maintain normal respiratory rate/effort  5/17/2021 0953 by Homer Franks RN  Outcome: Met This Shift     Problem: MOBILITY  Goal: Early mobilization is achieved  5/17/2021 0953 by Homer Franks RN  Outcome: Met This Shift     Problem: SKIN INTEGRITY  Goal: Skin integrity is maintained or improved  5/17/2021 0953 by Homer Franks RN  Outcome: Met This Shift     Problem: Pain:  Goal: Pain level will decrease  Description: Pain level will decrease  5/17/2021 0953 by Homer Franks RN  Outcome: Met This Shift

## 2021-05-17 NOTE — DISCHARGE SUMMARY
Physician Discharge Summary     Arsenio Rizvi  [id number]    Admit date: 5/15/2021    Discharge date and time: No discharge date for patient encounter. Admitting Physician: Mayela Grey MD     Admission Diagnoses: Acute cholecystitis [K81.0]    Condition at discharge: stable   Ray, [address] [facility] Medication Instructions MLA:[id number]    Printed on:05/17/21 0742   Medication Information                      docusate sodium (COLACE) 100 MG capsule  Take 1 capsule by mouth 2 times daily             ibuprofen (ADVIL;MOTRIN) 800 MG tablet  Take 1 tablet by mouth every 6 hours as needed for Pain             loratadine (CLARITIN) 10 MG tablet  Take 10 mg by mouth as needed             oxyCODONE-acetaminophen (PERCOCET) 5-325 MG per tablet  Take 1 tablet by mouth every 6 hours as needed for Pain for up to 5 days. Intended supply: 5 days. Take lowest dose possible to manage pain                   Hospital Course: Had uncomplicated lap malena and uncomplicated post operative course and was discharged tolerating a general diet, having good bowel function, ambulating independently and with adequate analgesia. Discharge Exam: BP (!) 147/87   Pulse 85   Temp 98.6 °F (37 °C) (Oral)   Resp 22   Ht 5' 8\" (1.727 m)   LMP 05/16/2021   SpO2 99%   BMI 47.14 kg/m²     General appearance: alert, appears stated age and cooperative  Head: Normocephalic, without obvious abnormality, atraumatic  Neck: no adenopathy, no carotid bruit, no JVD, supple, symmetrical, trachea midline and thyroid not enlarged, symmetric, no tenderness/mass/nodules  Lungs: clear to auscultation bilaterally  Heart: regular rate and rhythm, S1, S2 normal, no murmur, click, rub or gallop  Abdomen: soft, non-tender; bowel sounds normal; no masses,  no organomegaly and incisions clean and dry  Extremities: extremities normal, atraumatic, no cyanosis or edema      Disposition: home    Patient Instructions:      Activity: activity as tolerated  Diet: regular diet  Wound Care: keep wound clean and dry    Follow-up with Dr. Primitivo Flores in 2 weeks.     Signed:  Edgar Hernandez MD  5/17/2021  7:42 AM

## 2021-06-03 ENCOUNTER — OFFICE VISIT (OUTPATIENT)
Dept: SURGERY | Age: 21
End: 2021-06-03

## 2021-06-03 VITALS
TEMPERATURE: 97.5 F | DIASTOLIC BLOOD PRESSURE: 88 MMHG | WEIGHT: 293 LBS | SYSTOLIC BLOOD PRESSURE: 137 MMHG | OXYGEN SATURATION: 98 % | BODY MASS INDEX: 44.41 KG/M2 | RESPIRATION RATE: 18 BRPM | HEIGHT: 68 IN | HEART RATE: 74 BPM

## 2021-06-03 DIAGNOSIS — K80.20 SYMPTOMATIC CHOLELITHIASIS: Primary | ICD-10-CM

## 2021-06-03 PROCEDURE — 99024 POSTOP FOLLOW-UP VISIT: CPT | Performed by: SURGERY

## 2021-06-03 RX ORDER — LISINOPRIL 5 MG/1
TABLET ORAL
COMMUNITY
Start: 2021-05-27 | End: 2021-08-08

## 2021-06-03 RX ORDER — ERGOCALCIFEROL 1.25 MG/1
CAPSULE ORAL
COMMUNITY
Start: 2021-06-01 | End: 2021-08-08

## 2021-06-03 RX ORDER — FERROUS SULFATE 325(65) MG
TABLET ORAL
COMMUNITY
Start: 2021-06-01 | End: 2021-08-08

## 2021-06-03 NOTE — PROGRESS NOTES
Surgery Progress Note            Chief complaint:   Patient Active Problem List   Diagnosis    Gastroesophageal reflux disease without esophagitis    Morbid obesity ([address]    Vitamin D deficiency    Iron deficiency    Acute cholecystitis       S: doing well    O:   Vitals:    06/03/21 1036   BP: 137/88   Pulse: 74   Resp: 18   Temp: 97.5 °F (36.4 °C)   SpO2: 98%     No intake or output data in the 24 hours ending 06/03/21 1041        Labs:  No results for input(s): WBC, HGB, HCT in the last 72 hours. Invalid input(s): PLR  Lab Results   Component Value Date    CREATININE 1.0 05/16/2021    BUN 9 05/16/2021     05/16/2021    K 4.1 05/16/2021     05/16/2021    CO2 25 05/16/2021     No results for input(s): LIPASE, AMYLASE in the last 72 hours. Physical exam:   /88 (Site: Left Upper Arm, Position: Sitting, Cuff Size: Medium Adult)   Pulse 74   Temp 97.5 °F (36.4 °C) (Temporal)   Resp 18   Ht 5' 8\" (1.727 m)   Wt (!) 310 lb (140.6 kg)   LMP 05/16/2021   SpO2 98%   BMI 47.14 kg/m²   General appearance: NAD  Head: NCAT  Neck: supple, no masses  Lungs: equal chest rise bilateral  Heart: S1S2 present  Abdomen: soft, nontender, nondistended  Skin; no new lesions, incisions clean and intact  Gu: no cva tenderness  Extremities: extremities normal, atraumatic, no cyanosis or edema    A:  Post op lap malena    P: follow up as needed.      Jordyn Kang MD, MD  6/3/2021

## 2021-08-08 ENCOUNTER — APPOINTMENT (OUTPATIENT)
Dept: GENERAL RADIOLOGY | Age: 21
End: 2021-08-08
Payer: COMMERCIAL

## 2021-08-08 ENCOUNTER — HOSPITAL ENCOUNTER (EMERGENCY)
Age: 21
Discharge: HOME | End: 2021-08-09
Attending: EMERGENCY MEDICINE
Payer: COMMERCIAL

## 2021-08-08 VITALS
OXYGEN SATURATION: 98 % | DIASTOLIC BLOOD PRESSURE: 71 MMHG | TEMPERATURE: 97.3 F | RESPIRATION RATE: 18 BRPM | WEIGHT: 293 LBS | SYSTOLIC BLOOD PRESSURE: 152 MMHG | HEIGHT: 68 IN | HEART RATE: 113 BPM | BODY MASS INDEX: 44.41 KG/M2

## 2021-08-08 DIAGNOSIS — U07.1 COVID-19: Primary | ICD-10-CM

## 2021-08-08 LAB
HCG, URINE, POC: NEGATIVE
Lab: NORMAL
NEGATIVE QC PASS/FAIL: NORMAL
POSITIVE QC PASS/FAIL: NORMAL
SARS-COV-2, NAAT: DETECTED

## 2021-08-08 PROCEDURE — 71045 X-RAY EXAM CHEST 1 VIEW: CPT

## 2021-08-08 PROCEDURE — 87635 SARS-COV-2 COVID-19 AMP PRB: CPT

## 2021-08-08 PROCEDURE — 99283 EMERGENCY DEPT VISIT LOW MDM: CPT

## 2021-08-09 RX ORDER — MULTIVIT-MIN/IRON/FOLIC ACID/K 18-600-40
500 CAPSULE ORAL DAILY
Qty: 30 CAPSULE | Refills: 0 | Status: SHIPPED | OUTPATIENT
Start: 2021-08-09 | End: 2021-09-08

## 2021-08-09 RX ORDER — IBUPROFEN 800 MG/1
800 TABLET ORAL EVERY 8 HOURS PRN
Qty: 21 TABLET | Refills: 0 | Status: SHIPPED | OUTPATIENT
Start: 2021-08-09 | End: 2021-08-16

## 2021-08-09 NOTE — ED PROVIDER NOTES
Department of Emergency Medicine   ED  Provider Note  Admit Date/RoomTime: 8/8/2021 10:38 PM  ED Room: 12/12          History of Present Illness:  8/8/21, Time: 10:49 PM EDT  Chief Complaint   Patient presents with    Fever     high fever for few days, bodyaches, cough, also boil on back                Frantz Jenkins is a 23 y.o. female presenting to the ED for fever and cough , beginning a few days. The complaint has been persistent, moderate in severity, and worsened by nothing. Patient presents for fevers chills productive cough. She states subjective fevers for the last several days. Myalgias and cough occasionally productive of nondescript sputum. Denies dysuria hematuria. Also states she has a cyst on her back which recently drained. She denies pain in that area. Has not been vaccinated for COVID-19 and does not believe she has had it. Review of Systems:   Pertinent positives and negatives are stated within HPI, all other systems reviewed and are negative.        --------------------------------------------- PAST HISTORY ---------------------------------------------  Past Medical History:  has a past medical history of Headache, Iron deficiency, Sciatica, and Vitamin D deficiency. Past Surgical History:  has a past surgical history that includes Upper gastrointestinal endoscopy (N/A, 6/7/2019) and Cholecystectomy, laparoscopic (N/A, 5/16/2021). Social History:  reports that she has never smoked. She has never used smokeless tobacco. She reports current alcohol use. She reports that she does not use drugs. Family History: family history is not on file. . Unless otherwise noted, family history is non contributory    The patients home medications have been reviewed.     Allergies: Latex and Other        ---------------------------------------------------PHYSICAL EXAM--------------------------------------    Constitutional/General: Alert and oriented x3 BMI 54  Head: Normocephalic and atraumatic  Eyes: PERRL, EOMI, sclera non icteric  Mouth: Oropharynx clear, handling secretions, no trismus, no asymmetry of the posterior oropharynx or uvular edema  Neck: Supple, full ROM, no stridor, no meningeal signs  Respiratory: Lungs diminished throughout,Not in respiratory distress  Cardiovascular: Tachycardic at 100 and regular 2+ distal pulses. Equal extremity pulses. Chest: No chest wall tenderness  GI:  Abdomen Soft, Non tender, Non distended. No rebound, guarding, or rigidity. Musculoskeletal: Moves all extremities x 4. Warm and well perfused,  . Capillary refill <3 seconds  Integument: skin warm and dry. No rashes. Neurologic: GCS 15, no focal deficits,   Psychiatric: Normal Affect           -------------------------------------------------- RESULTS -------------------------------------------------  I have personally reviewed all laboratory and imaging results for this patient. Results are listed below. LABS: (Lab results interpreted by me)  Results for orders placed or performed during the hospital encounter of 08/08/21   COVID-19, Rapid    Specimen: Nares   Result Value Ref Range    SARS-CoV-2, NAAT DETECTED (A) Not Detected   POC Pregnancy Urine   Result Value Ref Range    HCG, Urine, POC Negative Negative    Lot Number [id number]     Positive QC Pass/Fail Pass     Negative QC Pass/Fail Pass    ,       RADIOLOGY:  Interpreted by Radiologist unless otherwise specified  XR CHEST PORTABLE   Final Result   No acute process.                           ------------------------- NURSING NOTES AND VITALS REVIEWED ---------------------------   The nursing notes within the ED encounter and vital signs as below have been reviewed by myself  BP (!) 152/71   Pulse 113   Temp 97.3 °F (36.3 °C)   Resp 18   Ht 5' 8\" (1.727 m)   Wt (!) 359 lb (162.8 kg)   LMP 07/15/2021   SpO2 98%   BMI 54.59 kg/m²     Oxygen Saturation Interpretation: Normal       The patients available past medical records and past encounters were reviewed. ------------------------------ ED COURSE/MEDICAL DECISION MAKING----------------------  Medications - No data to display                 Medical Decision Making:     I, Dr. Dm Mckenzie am the primary provider of record    Evidence of active COVID-19 infection. Discussed isolation self-care precautions need for follow-up. She states understanding and agreement           This patient's ED course included: a personal history and physicial examination, re-evaluation prior to disposition, continuous pulse oximetry and complex medical decision making and emergency management    This patient has remained hemodynamically stable during their ED course. Counseling: The emergency provider has spoken with the patient and discussed todays results, in addition to providing specific details for the plan of care and counseling regarding the diagnosis and prognosis. Questions are answered at this time and they are agreeable with the plan.       --------------------------------- IMPRESSION AND DISPOSITION ---------------------------------    IMPRESSION  1. COVID-19        DISPOSITION  Disposition: Discharge to home  Patient condition is stable        NOTE: This report was transcribed using voice recognition software.  Every effort was made to ensure accuracy; however, inadvertent computerized transcription errors may be present       Olivia Goldstein DO  08/09/21 0031

## 2021-08-09 NOTE — ED NOTES
Pt c/o generalized body aches that started over the last couple of days. Pt stated that she has had a fever. Pt stated that she never checked her temperature, she just felt like she had a fever.      Selma Villarreal RN  08/09/21 6735

## 2021-08-10 ENCOUNTER — CARE COORDINATION (OUTPATIENT)
Dept: CARE COORDINATION | Age: 21
End: 2021-08-10

## 2021-08-10 NOTE — CARE COORDINATION
Patient contacted regarding COVID-19 diagnosis. Discussed COVID-19 related testing which was available at this time. Test results were positive. Patient informed of results, if available? Yes. Ambulatory Care Manager contacted the patient by telephone to perform post discharge assessment. Call within 2 business days of discharge: Yes. Verified name and  with patient as identifiers. Provided introduction to self, and explanation of the CTN/ACM role, and reason for call due to risk factors for infection and/or exposure to COVID-19. Symptoms reviewed with patient who verbalized the following symptoms: fever, fatigue, cough, loss of taste or smell, no new symptoms and no worsening symptoms. Due to no new or worsening symptoms encounter was not routed to provider for escalation. Discussed follow-up appointments. If no appointment was previously scheduled, appointment scheduling offered: Yes. [facility] follow up appointment(s): No future appointments. Non-[facility] follow up appointment(s): Marcus declined assistance scheduling and states she will call DONOVAN Gilbert to schedule. Non-face-to-face services provided:  Obtained and reviewed discharge summary and/or continuity of care documents     Advance Care Planning:   Does patient have an Advance Directive:  decision maker updated. Educated patient about risk for severe COVID-19 due to risk factors according to CDC guidelines. ACM reviewed discharge instructions, medical action plan and red flag symptoms with the patient who verbalized understanding. Discussed COVID vaccination status: Yes. Education provided on COVID-19 vaccination as appropriate. Discussed exposure protocols and quarantine with CDC Guidelines. Patient was given an opportunity to verbalize any questions and concerns and agrees to contact ACM or health care provider for questions related to their healthcare.     Reviewed and educated patient on any new and changed medications related to discharge diagnosis     Marcus denies any shortness of breath. She reports she is taking her vitamin C, zinc and ibuprofen as prescribed. MyChart program was explained and ACM reviewed there is an access code on her AVS if she should choose to sign up. Was patient discharged with a pulse oximeter? No    ACM provided contact information. Plan for follow-up call in 5-7 days based on severity of symptoms and risk factors.

## 2021-08-17 ENCOUNTER — CARE COORDINATION (OUTPATIENT)
Dept: CARE COORDINATION | Age: 21
End: 2021-08-17

## 2021-08-17 NOTE — CARE COORDINATION
Attempted to follow up with pt regarding recent covid dx no answer left hipaa compliant message with call back info will attempt again in one week

## 2021-08-17 NOTE — CARE COORDINATION
Pt returned call and stated she is feeling better now and there was no need for further follow up calls will close this encounter

## 2022-01-11 ENCOUNTER — HOSPITAL ENCOUNTER (EMERGENCY)
Age: 22
Discharge: HOME | End: 2022-01-11
Attending: EMERGENCY MEDICINE
Payer: COMMERCIAL

## 2022-01-11 VITALS
SYSTOLIC BLOOD PRESSURE: 146 MMHG | DIASTOLIC BLOOD PRESSURE: 50 MMHG | WEIGHT: 293 LBS | OXYGEN SATURATION: 96 % | HEIGHT: 68 IN | HEART RATE: 76 BPM | RESPIRATION RATE: 16 BRPM | TEMPERATURE: 98.3 F | BODY MASS INDEX: 44.41 KG/M2

## 2022-01-11 DIAGNOSIS — T78.40XA ALLERGIC REACTION, INITIAL ENCOUNTER: Primary | ICD-10-CM

## 2022-01-11 PROCEDURE — 99284 EMERGENCY DEPT VISIT MOD MDM: CPT

## 2022-01-11 RX ORDER — EPINEPHRINE 0.3 MG/.3ML
INJECTION SUBCUTANEOUS
Qty: 1 EACH | Refills: 0 | Status: SHIPPED | OUTPATIENT
Start: 2022-01-11

## 2022-01-11 ASSESSMENT — ENCOUNTER SYMPTOMS
VOMITING: 0
EYE DISCHARGE: 0
DIARRHEA: 0
ABDOMINAL DISTENTION: 0
NAUSEA: 0
BACK PAIN: 0
EYE REDNESS: 0
EYE PAIN: 0
WHEEZING: 0
COUGH: 0
SHORTNESS OF BREATH: 0
ALLERGIC REACTION: 1

## 2022-01-11 NOTE — ED PROVIDER NOTES
Patient reports that she had a \"allergic reaction. \"  She states she took generic pain pill from the [employer] and shortly thereafter began to feel her thighs burning. She states this is how her allergic reaction is always began. She states she broke out in some hives and EMS was summoned. They state they were unable to establish an IV so they gave her injection of epinephrine and Benadryl. By arrival, she states all her symptoms have resolved and she is asymptomatic. The history is provided by the patient. Allergic Reaction  Presenting symptoms: itching and rash    Presenting symptoms: no wheezing    Severity:  Mild  Duration:  30 minutes  Prior allergic episodes: Allergies to medications  Relieved by: Antihistamines and epinephrine       Review of Systems   Constitutional: Negative for chills and fever. Eyes: Negative for pain, discharge and redness. Respiratory: Negative for cough, shortness of breath and wheezing. Cardiovascular: Negative for chest pain. Gastrointestinal: Negative for abdominal distention, diarrhea, nausea and vomiting. Genitourinary: Negative for dysuria and frequency. Musculoskeletal: Negative for arthralgias and back pain. Skin: Positive for itching and rash. Negative for wound. Neurological: Negative for weakness and headaches. Hematological: Negative for adenopathy. All other systems reviewed and are negative. Physical Exam  Vitals and nursing note reviewed. Constitutional:       Appearance: She is well-developed. HENT:      Head: Normocephalic and atraumatic. Comments: Airway is widely patent. Eyes:      Pupils: Pupils are equal, round, and reactive to light. Cardiovascular:      Rate and Rhythm: Normal rate and regular rhythm. Heart sounds: Normal heart sounds. No murmur heard. Pulmonary:      Effort: Pulmonary effort is normal. No respiratory distress. Breath sounds: Normal breath sounds. No wheezing or rales. Abdominal:      General: Bowel sounds are normal.      Palpations: Abdomen is soft. Tenderness: There is no abdominal tenderness. There is no guarding or rebound. Musculoskeletal:      Cervical back: Normal range of motion and neck supple. Skin:     General: Skin is warm and dry. Neurological:      Mental Status: She is alert and oriented to person, place, and time. Cranial Nerves: No cranial nerve deficit. Coordination: Coordination normal.          Procedures     MDM     5:20 PM EST  I would like to admit the patient at this time. She states she is anxious for discharge. She states she continues to feel fine and has had no recurrence of symptoms. I have described with her possibility of return of symptoms. We will prescribe an epinephrine pen. She will get that picked up today.           --------------------------------------------- PAST HISTORY ---------------------------------------------  Past Medical History:  has a past medical history of Headache, Iron deficiency, Sciatica, and Vitamin D deficiency. Past Surgical History:  has a past surgical history that includes Upper gastrointestinal endoscopy (N/A, 6/7/2019) and Cholecystectomy, laparoscopic (N/A, 5/16/2021). Social History:  reports that she has never smoked. She has never used smokeless tobacco. She reports current alcohol use. She reports that she does not use drugs. Family History: family history is not on file. The patients home medications have been reviewed. Allergies: Latex, Chlorine, and Other    -------------------------------------------------- RESULTS -------------------------------------------------  Labs:  No results found for this visit on 01/11/22.     Radiology:  No orders to display       ------------------------- NURSING NOTES AND VITALS REVIEWED ---------------------------  Date / Time Roomed:  1/11/2022  3:28 PM  ED Bed Assignment:  [id number]    The nursing notes within the ED encounter and vital signs as below have been reviewed. /72   Pulse 70   Temp 97.8 °F (36.6 °C) (Oral)   Resp 25   Ht 5' 8\" (1.727 m)   Wt (!) 370 lb (167.8 kg)   SpO2 98%   BMI 56.26 kg/m²   Oxygen Saturation Interpretation: Normal      ------------------------------------------ PROGRESS NOTES ------------------------------------------  5:21 PM EST  I have spoken with the patient and discussed todays results, in addition to providing specific details for the plan of care and counseling regarding the diagnosis and prognosis. Their questions are answered at this time and they are agreeable with the plan. I discussed at length with them reasons for immediate return here for re evaluation. They will followup with their primary care physician by calling their office tomorrow. --------------------------------- ADDITIONAL PROVIDER NOTES ---------------------------------  At this time the patient is without objective evidence of an acute process requiring hospitalization or inpatient management. They have remained hemodynamically stable throughout their entire ED visit and are stable for discharge with outpatient follow-up. The plan has been discussed in detail and they are aware of the specific conditions for emergent return, as well as the importance of follow-up. New Prescriptions    EPINEPHRINE (EPIPEN 2-WILFRED) 0.3 MG/0.3ML SOAJ INJECTION    Use as directed for allergic reaction       Diagnosis:  1. Allergic reaction, initial encounter        Disposition:  Patient's disposition: Discharge to home  Patient's condition is stable.        Lisa Silva DO  01/11/22 0426

## (undated) DEVICE — BLOCK BITE 60FR CAREGUARD

## (undated) DEVICE — KIT BEDSIDE REVITAL OX 500ML

## (undated) DEVICE — PLUMEPORT LAPAROSCOPIC SMOKE FILTRATION DEVICE: Brand: PLUMEPORT ACTIV

## (undated) DEVICE — FORCEPS BX L240CM JAW DIA2.8MM L CAP W/ NDL MIC MESH TOOTH

## (undated) DEVICE — APPLICATOR MEDICATED 26 CC SOLUTION HI LT ORNG CHLORAPREP

## (undated) DEVICE — TROCAR: Brand: KII SLEEVE

## (undated) DEVICE — KENDALL 450 SERIES MONITORING FOAM ELECTRODE - RECTANGULAR SHAPE ( 3/PK): Brand: KENDALL

## (undated) DEVICE — MEDI-VAC YANKAUER SUCTION HANDLE W/BULBOUS TIP: Brand: CARDINAL HEALTH

## (undated) DEVICE — 6 X 9  1.75MIL 4-WALL LABGUARD: Brand: MINIGRIP COMMERCIAL LLC

## (undated) DEVICE — GLOVE ORANGE PI 8   MSG9080

## (undated) DEVICE — MASK,FACE,MAXFLUIDPROTECT,SHIELD/ERLPS: Brand: MEDLINE

## (undated) DEVICE — INSUFFLATION NEEDLE TO ESTABLISH PNEUMOPERITONEUM.: Brand: INSUFFLATION NEEDLE

## (undated) DEVICE — Z INACTIVE USE 2660664 SOLUTION IRRIG 3000ML 0.9% SOD CHL USP UROMATIC PLAS CONT

## (undated) DEVICE — TOWEL,OR,DSP,ST,BLUE,STD,6/PK,12PK/CS: Brand: MEDLINE

## (undated) DEVICE — GOWN ISOLATN REG YEL M WT MULTIPLY SIDETIE LEV 2

## (undated) DEVICE — GLOVE ORANGE PI 7 1/2   MSG9075

## (undated) DEVICE — SET ENDO INSTR LAPAROSCOPIC INCISIONAL

## (undated) DEVICE — PACK SURG LAP CHOLE CUSTOM

## (undated) DEVICE — APPLIER CLP M/L SHFT DIA5MM 15 LIG LIGAMAX 5

## (undated) DEVICE — GARMENT,MEDLINE,DVT,INT,CALF,MED, GEN2: Brand: MEDLINE

## (undated) DEVICE — PUMP SUC IRR TBNG L10FT W/ HNDPC ASSEMB STRYKEFLOW 2

## (undated) DEVICE — TROCAR: Brand: KII FIOS FIRST ENTRY

## (undated) DEVICE — CONTAINER SPEC COLL 960ML POLYPR TRIANG GRAD INTAKE/OUTPUT

## (undated) DEVICE — MEDI-VAC NON-CONDUCTIVE SUCTION TUBING: Brand: CARDINAL HEALTH

## (undated) DEVICE — CAMERA STRYKER 1488 HD GEN

## (undated) DEVICE — SKIN AFFIX SURG ADHESIVE 72/CS 0.55ML: Brand: MEDLINE

## (undated) DEVICE — SET ENDO INSTR RED YEL LAPAROSCOPIC

## (undated) DEVICE — COVER,LIGHT HANDLE,FLX,1/PK: Brand: MEDLINE INDUSTRIES, INC.

## (undated) DEVICE — ELECTRODE PT RET AD L9FT HI MOIST COND ADH HYDRGEL CORDED

## (undated) DEVICE — MARKER,SKIN,WI/RULER AND LABELS: Brand: MEDLINE

## (undated) DEVICE — SPONGE GZ 4IN 4IN 4 PLY N WVN AVANT

## (undated) DEVICE — LUBRICANT SURG JELLY ST BACTER TUBE 4.25OZ

## (undated) DEVICE — SYRINGE MED 10ML TRNSLUC BRL PLUNG BLK MRK POLYPR CTRL

## (undated) DEVICE — PMI PTFE COATED LAPAROSCOPIC WIRE L-HOOK 44 CM: Brand: PMI

## (undated) DEVICE — Device: Brand: DEFENDO VALVE AND CONNECTOR KIT

## (undated) DEVICE — DOUBLE BASIN SET: Brand: MEDLINE INDUSTRIES, INC.

## (undated) DEVICE — NEEDLE HYPO 25GA L1.5IN BLU POLYPR HUB S STL REG BVL STR

## (undated) DEVICE — [HIGH FLOW INSUFFLATOR,  DO NOT USE IF PACKAGE IS DAMAGED,  KEEP DRY,  KEEP AWAY FROM SUNLIGHT,  PROTECT FROM HEAT AND RADIOACTIVE SOURCES.]: Brand: PNEUMOSURE